# Patient Record
Sex: MALE | Race: WHITE | NOT HISPANIC OR LATINO | URBAN - METROPOLITAN AREA
[De-identification: names, ages, dates, MRNs, and addresses within clinical notes are randomized per-mention and may not be internally consistent; named-entity substitution may affect disease eponyms.]

---

## 2019-04-30 ENCOUNTER — EMERGENCY (EMERGENCY)
Facility: HOSPITAL | Age: 44
LOS: 1 days | Discharge: HOME | End: 2019-04-30
Attending: EMERGENCY MEDICINE | Admitting: EMERGENCY MEDICINE
Payer: COMMERCIAL

## 2019-04-30 VITALS
TEMPERATURE: 98 F | HEART RATE: 112 BPM | RESPIRATION RATE: 18 BRPM | HEIGHT: 66 IN | OXYGEN SATURATION: 100 % | WEIGHT: 222.01 LBS | SYSTOLIC BLOOD PRESSURE: 141 MMHG | DIASTOLIC BLOOD PRESSURE: 87 MMHG

## 2019-04-30 PROCEDURE — 99284 EMERGENCY DEPT VISIT MOD MDM: CPT | Mod: 25

## 2019-04-30 PROCEDURE — 93010 ELECTROCARDIOGRAM REPORT: CPT

## 2019-04-30 NOTE — ED ADULT TRIAGE NOTE - CHIEF COMPLAINT QUOTE
Pt presents to ED for eval of left lower leg swelling since Saturday. Pt states that pain and swelling has been increasing since Saturday. Pt c/o mild intermittent SOB, denies chest pain or pressure. States familial hx of dvt. Pt has hx of diabetes and smoking.

## 2019-05-01 VITALS
HEART RATE: 81 BPM | OXYGEN SATURATION: 100 % | SYSTOLIC BLOOD PRESSURE: 136 MMHG | DIASTOLIC BLOOD PRESSURE: 88 MMHG | RESPIRATION RATE: 18 BRPM | TEMPERATURE: 98 F

## 2019-05-01 LAB
ALBUMIN SERPL ELPH-MCNC: 4.7 G/DL — SIGNIFICANT CHANGE UP (ref 3.3–5)
ALP SERPL-CCNC: 75 U/L — SIGNIFICANT CHANGE UP (ref 40–120)
ALT FLD-CCNC: 20 U/L — SIGNIFICANT CHANGE UP (ref 4–41)
ANION GAP SERPL CALC-SCNC: 15 MMO/L — HIGH (ref 7–14)
APTT BLD: 49.3 SEC — HIGH (ref 27.5–36.3)
AST SERPL-CCNC: 10 U/L — SIGNIFICANT CHANGE UP (ref 4–40)
BASE EXCESS BLDV CALC-SCNC: 1.3 MMOL/L — SIGNIFICANT CHANGE UP
BASOPHILS # BLD AUTO: 0.11 K/UL — SIGNIFICANT CHANGE UP (ref 0–0.2)
BASOPHILS NFR BLD AUTO: 1.1 % — SIGNIFICANT CHANGE UP (ref 0–2)
BILIRUB SERPL-MCNC: 0.6 MG/DL — SIGNIFICANT CHANGE UP (ref 0.2–1.2)
BLOOD GAS VENOUS - CREATININE: 0.69 MG/DL — SIGNIFICANT CHANGE UP (ref 0.5–1.3)
BUN SERPL-MCNC: 19 MG/DL — SIGNIFICANT CHANGE UP (ref 7–23)
CALCIUM SERPL-MCNC: 9.8 MG/DL — SIGNIFICANT CHANGE UP (ref 8.4–10.5)
CHLORIDE BLDV-SCNC: 106 MMOL/L — SIGNIFICANT CHANGE UP (ref 96–108)
CHLORIDE SERPL-SCNC: 101 MMOL/L — SIGNIFICANT CHANGE UP (ref 98–107)
CO2 SERPL-SCNC: 24 MMOL/L — SIGNIFICANT CHANGE UP (ref 22–31)
CREAT SERPL-MCNC: 0.84 MG/DL — SIGNIFICANT CHANGE UP (ref 0.5–1.3)
EOSINOPHIL # BLD AUTO: 0.74 K/UL — HIGH (ref 0–0.5)
EOSINOPHIL NFR BLD AUTO: 7.6 % — HIGH (ref 0–6)
GAS PNL BLDV: 138 MMOL/L — SIGNIFICANT CHANGE UP (ref 136–146)
GLUCOSE BLDV-MCNC: 194 — HIGH (ref 70–99)
GLUCOSE SERPL-MCNC: 203 MG/DL — HIGH (ref 70–99)
HCO3 BLDV-SCNC: 24 MMOL/L — SIGNIFICANT CHANGE UP (ref 20–27)
HCT VFR BLD CALC: 46.6 % — SIGNIFICANT CHANGE UP (ref 39–50)
HCT VFR BLDV CALC: 52 % — HIGH (ref 39–51)
HGB BLD-MCNC: 16.3 G/DL — SIGNIFICANT CHANGE UP (ref 13–17)
HGB BLDV-MCNC: 17 G/DL — SIGNIFICANT CHANGE UP (ref 13–17)
IMM GRANULOCYTES NFR BLD AUTO: 0.5 % — SIGNIFICANT CHANGE UP (ref 0–1.5)
INR BLD: 0.97 — SIGNIFICANT CHANGE UP (ref 0.88–1.17)
LACTATE BLDV-MCNC: 1.5 MMOL/L — SIGNIFICANT CHANGE UP (ref 0.5–2)
LYMPHOCYTES # BLD AUTO: 1.57 K/UL — SIGNIFICANT CHANGE UP (ref 1–3.3)
LYMPHOCYTES # BLD AUTO: 16.2 % — SIGNIFICANT CHANGE UP (ref 13–44)
MCHC RBC-ENTMCNC: 34.7 PG — HIGH (ref 27–34)
MCHC RBC-ENTMCNC: 35 % — SIGNIFICANT CHANGE UP (ref 32–36)
MCV RBC AUTO: 99.1 FL — SIGNIFICANT CHANGE UP (ref 80–100)
MONOCYTES # BLD AUTO: 1 K/UL — HIGH (ref 0–0.9)
MONOCYTES NFR BLD AUTO: 10.3 % — SIGNIFICANT CHANGE UP (ref 2–14)
NEUTROPHILS # BLD AUTO: 6.23 K/UL — SIGNIFICANT CHANGE UP (ref 1.8–7.4)
NEUTROPHILS NFR BLD AUTO: 64.3 % — SIGNIFICANT CHANGE UP (ref 43–77)
NRBC # FLD: 0 K/UL — SIGNIFICANT CHANGE UP (ref 0–0)
PCO2 BLDV: 49 MMHG — SIGNIFICANT CHANGE UP (ref 41–51)
PH BLDV: 7.35 PH — SIGNIFICANT CHANGE UP (ref 7.32–7.43)
PLATELET # BLD AUTO: 245 K/UL — SIGNIFICANT CHANGE UP (ref 150–400)
PMV BLD: 10.7 FL — SIGNIFICANT CHANGE UP (ref 7–13)
PO2 BLDV: 37 MMHG — SIGNIFICANT CHANGE UP (ref 35–40)
POTASSIUM BLDV-SCNC: 4 MMOL/L — SIGNIFICANT CHANGE UP (ref 3.4–4.5)
POTASSIUM SERPL-MCNC: 4.1 MMOL/L — SIGNIFICANT CHANGE UP (ref 3.5–5.3)
POTASSIUM SERPL-SCNC: 4.1 MMOL/L — SIGNIFICANT CHANGE UP (ref 3.5–5.3)
PROT SERPL-MCNC: 7.4 G/DL — SIGNIFICANT CHANGE UP (ref 6–8.3)
PROTHROM AB SERPL-ACNC: 10.8 SEC — SIGNIFICANT CHANGE UP (ref 9.8–13.1)
RBC # BLD: 4.7 M/UL — SIGNIFICANT CHANGE UP (ref 4.2–5.8)
RBC # FLD: 11.5 % — SIGNIFICANT CHANGE UP (ref 10.3–14.5)
SAO2 % BLDV: 69.5 % — SIGNIFICANT CHANGE UP (ref 60–85)
SODIUM SERPL-SCNC: 140 MMOL/L — SIGNIFICANT CHANGE UP (ref 135–145)
TROPONIN T, HIGH SENSITIVITY: 7 NG/L — SIGNIFICANT CHANGE UP (ref ?–14)
TROPONIN T, HIGH SENSITIVITY: 7 NG/L — SIGNIFICANT CHANGE UP (ref ?–14)
WBC # BLD: 9.7 K/UL — SIGNIFICANT CHANGE UP (ref 3.8–10.5)
WBC # FLD AUTO: 9.7 K/UL — SIGNIFICANT CHANGE UP (ref 3.8–10.5)

## 2019-05-01 PROCEDURE — 93971 EXTREMITY STUDY: CPT | Mod: 26,LT

## 2019-05-01 PROCEDURE — 71275 CT ANGIOGRAPHY CHEST: CPT | Mod: 26

## 2019-05-01 RX ORDER — KETOROLAC TROMETHAMINE 30 MG/ML
15 SYRINGE (ML) INJECTION ONCE
Qty: 0 | Refills: 0 | Status: DISCONTINUED | OUTPATIENT
Start: 2019-05-01 | End: 2019-05-01

## 2019-05-01 RX ORDER — SODIUM CHLORIDE 9 MG/ML
1000 INJECTION INTRAMUSCULAR; INTRAVENOUS; SUBCUTANEOUS ONCE
Qty: 0 | Refills: 0 | Status: COMPLETED | OUTPATIENT
Start: 2019-05-01 | End: 2019-05-01

## 2019-05-01 RX ADMIN — SODIUM CHLORIDE 1000 MILLILITER(S): 9 INJECTION INTRAMUSCULAR; INTRAVENOUS; SUBCUTANEOUS at 00:56

## 2019-05-01 RX ADMIN — Medication 15 MILLIGRAM(S): at 00:56

## 2019-05-01 NOTE — ED PROVIDER NOTE - CLINICAL SUMMARY MEDICAL DECISION MAKING FREE TEXT BOX
44 year old male with a PMHx of DM, atopic dermatitis, +tobacco use pw atraumatic left lower extremity pain, swelling and redness for the past week.   Plan: labs, duplex r/o DVT

## 2019-05-01 NOTE — ED ADULT NURSE REASSESSMENT NOTE - NS ED NURSE REASSESS COMMENT FT1
Patient is awake, A&O x 3, NAD, resting in bed, appear comfortable and in no apparent distress.  Denies any pain at this time.  Vitals taken and will continue to monitor patient.

## 2019-05-01 NOTE — ED ADULT NURSE NOTE - OBJECTIVE STATEMENT
pt brought into intake room 1 A&OX4 ambulatory self care male presents to the ed today for bilateral leg pain. Patient states he has a family history of blood clots and is nervous due to hot, painful and swollen area on left leg. PT speaking in clear and full sentences and denies SOB. 18G IV placed in right AC. Labs drawn and sent.

## 2019-05-01 NOTE — ED PROVIDER NOTE - OBJECTIVE STATEMENT
44 year old male with a PMHx of DM, atopic dermatitis, +tobacco use pw atraumatic left lower extremity pain, swelling and redness for the past week. Pt states that tonight pain got worse. Endorses that for the past month he has been progressively getting increased [place]. Endorses that he has a family hx of DVT. Drives multiple hours daily for work and traveled to [facility] this past weekend. He has a chronic rash on bl lower extremities which is unchanged. Denies n/v/f/c, abdominal pain, dysuria, hematuria, melena, hematochezia, diarrhea, constipation, hemoptysis.

## 2019-05-01 NOTE — ED PROVIDER NOTE - ATTENDING CONTRIBUTION TO CARE
Dwyer: 44 yom DM, family history of DVT, smoker complaining of left leg pain and swelling after long car trip. PT also noted chest tightness and shortness of breath.  Pt is well appearing, no distress, clear lungs, normal cardiac, abd soft and non tender, hr over 100 in triage, left leg +calf larger than right side, no calf tn, no pitting edema. EKG unremarkable.  Pt is high risk for PE/DVT so labs and CT ordered, signed out to PA/MD to follow up results.

## 2019-05-01 NOTE — ED PROVIDER NOTE - PROGRESS NOTE DETAILS
carlos hanks: signed out to me by carlos weir to f.u CTA/ duplex. if negative dc. imaging within normal limits. all copies of reports given to patient. return precautions given. ready for dc.

## 2019-05-01 NOTE — ED PROVIDER NOTE - NSFOLLOWUPINSTRUCTIONS_ED_ALL_ED_FT
Follow up with your primary care physician in 48-72 hours- bring copies of your results.    Return to the Emergency Department for fevers, worsening or persistent symptoms OR ANY NEW OR CONCERNING SYMPTOMS.

## 2021-04-12 ENCOUNTER — INPATIENT (INPATIENT)
Facility: HOSPITAL | Age: 46
LOS: 3 days | Discharge: HOME | End: 2021-04-16
Attending: INTERNAL MEDICINE | Admitting: INTERNAL MEDICINE
Payer: COMMERCIAL

## 2021-04-12 VITALS
HEIGHT: 66 IN | SYSTOLIC BLOOD PRESSURE: 113 MMHG | TEMPERATURE: 98 F | DIASTOLIC BLOOD PRESSURE: 86 MMHG | OXYGEN SATURATION: 100 % | HEART RATE: 124 BPM | RESPIRATION RATE: 18 BRPM

## 2021-04-12 DIAGNOSIS — K12.2 CELLULITIS AND ABSCESS OF MOUTH: ICD-10-CM

## 2021-04-12 PROBLEM — L20.9 ATOPIC DERMATITIS, UNSPECIFIED: Chronic | Status: ACTIVE | Noted: 2019-05-01

## 2021-04-12 PROBLEM — E11.9 TYPE 2 DIABETES MELLITUS WITHOUT COMPLICATIONS: Chronic | Status: ACTIVE | Noted: 2019-05-01

## 2021-04-12 LAB
ALBUMIN SERPL ELPH-MCNC: 4.3 G/DL — SIGNIFICANT CHANGE UP (ref 3.3–5)
ALP SERPL-CCNC: 104 U/L — SIGNIFICANT CHANGE UP (ref 40–120)
ALT FLD-CCNC: 15 U/L — SIGNIFICANT CHANGE UP (ref 4–41)
ANION GAP SERPL CALC-SCNC: 27 MMOL/L — HIGH (ref 7–14)
ANION GAP SERPL CALC-SCNC: 33 MMOL/L — HIGH (ref 7–14)
APTT BLD: 52.2 SEC — HIGH (ref 27–36.3)
AST SERPL-CCNC: 12 U/L — SIGNIFICANT CHANGE UP (ref 4–40)
BASE EXCESS BLDV CALC-SCNC: -10.1 MMOL/L — LOW (ref -3–2)
BASOPHILS # BLD AUTO: 0.06 K/UL — SIGNIFICANT CHANGE UP (ref 0–0.2)
BASOPHILS # BLD AUTO: 0.06 K/UL — SIGNIFICANT CHANGE UP (ref 0–0.2)
BASOPHILS NFR BLD AUTO: 0.3 % — SIGNIFICANT CHANGE UP (ref 0–2)
BASOPHILS NFR BLD AUTO: 0.3 % — SIGNIFICANT CHANGE UP (ref 0–2)
BILIRUB SERPL-MCNC: 0.2 MG/DL — SIGNIFICANT CHANGE UP (ref 0.2–1.2)
BLOOD GAS VENOUS - CREATININE: 4.6 MG/DL — HIGH (ref 0.5–1.3)
BLOOD GAS VENOUS COMPREHENSIVE RESULT: SIGNIFICANT CHANGE UP
BLOOD GAS VENOUS COMPREHENSIVE RESULT: SIGNIFICANT CHANGE UP
BUN SERPL-MCNC: 96 MG/DL — HIGH (ref 7–23)
BUN SERPL-MCNC: 98 MG/DL — HIGH (ref 7–23)
CALCIUM SERPL-MCNC: 8.5 MG/DL — SIGNIFICANT CHANGE UP (ref 8.4–10.5)
CALCIUM SERPL-MCNC: 9.5 MG/DL — SIGNIFICANT CHANGE UP (ref 8.4–10.5)
CHLORIDE BLDV-SCNC: 100 MMOL/L — SIGNIFICANT CHANGE UP (ref 96–108)
CHLORIDE SERPL-SCNC: 81 MMOL/L — LOW (ref 98–107)
CHLORIDE SERPL-SCNC: 88 MMOL/L — LOW (ref 98–107)
CO2 SERPL-SCNC: 12 MMOL/L — LOW (ref 22–31)
CO2 SERPL-SCNC: 15 MMOL/L — LOW (ref 22–31)
CREAT SERPL-MCNC: 4.29 MG/DL — HIGH (ref 0.5–1.3)
CREAT SERPL-MCNC: 4.55 MG/DL — HIGH (ref 0.5–1.3)
EOSINOPHIL # BLD AUTO: 0.04 K/UL — SIGNIFICANT CHANGE UP (ref 0–0.5)
EOSINOPHIL # BLD AUTO: 0.1 K/UL — SIGNIFICANT CHANGE UP (ref 0–0.5)
EOSINOPHIL NFR BLD AUTO: 0.2 % — SIGNIFICANT CHANGE UP (ref 0–6)
EOSINOPHIL NFR BLD AUTO: 0.5 % — SIGNIFICANT CHANGE UP (ref 0–6)
GAS PNL BLDV: 127 MMOL/L — LOW (ref 136–146)
GLUCOSE BLDC GLUCOMTR-MCNC: 135 MG/DL — HIGH (ref 70–99)
GLUCOSE BLDC GLUCOMTR-MCNC: 150 MG/DL — HIGH (ref 70–99)
GLUCOSE BLDC GLUCOMTR-MCNC: 159 MG/DL — HIGH (ref 70–99)
GLUCOSE BLDC GLUCOMTR-MCNC: 222 MG/DL — HIGH (ref 70–99)
GLUCOSE BLDC GLUCOMTR-MCNC: 261 MG/DL — HIGH (ref 70–99)
GLUCOSE BLDC GLUCOMTR-MCNC: 409 MG/DL — HIGH (ref 70–99)
GLUCOSE BLDC GLUCOMTR-MCNC: 459 MG/DL — CRITICAL HIGH (ref 70–99)
GLUCOSE BLDV-MCNC: 341 MG/DL — HIGH (ref 70–99)
GLUCOSE SERPL-MCNC: 350 MG/DL — HIGH (ref 70–99)
GLUCOSE SERPL-MCNC: 427 MG/DL — HIGH (ref 70–99)
HCO3 BLDV-SCNC: 14 MMOL/L — LOW (ref 20–27)
HCT VFR BLD CALC: 45.7 % — SIGNIFICANT CHANGE UP (ref 39–50)
HCT VFR BLD CALC: 48.2 % — SIGNIFICANT CHANGE UP (ref 39–50)
HCT VFR BLDA CALC: 50.4 % — SIGNIFICANT CHANGE UP (ref 39–51)
HGB BLD CALC-MCNC: 16.5 G/DL — SIGNIFICANT CHANGE UP (ref 13–17)
HGB BLD-MCNC: 16.6 G/DL — SIGNIFICANT CHANGE UP (ref 13–17)
HGB BLD-MCNC: 17.7 G/DL — HIGH (ref 13–17)
IANC: 17.93 K/UL — HIGH (ref 1.5–8.5)
IANC: 18.29 K/UL — HIGH (ref 1.5–8.5)
IMM GRANULOCYTES NFR BLD AUTO: 0.6 % — SIGNIFICANT CHANGE UP (ref 0–1.5)
IMM GRANULOCYTES NFR BLD AUTO: 0.6 % — SIGNIFICANT CHANGE UP (ref 0–1.5)
INR BLD: 1.29 RATIO — HIGH (ref 0.88–1.16)
LACTATE BLDV-MCNC: 1.8 MMOL/L — SIGNIFICANT CHANGE UP (ref 0.5–2)
LG PLATELETS BLD QL AUTO: SLIGHT — SIGNIFICANT CHANGE UP
LYMPHOCYTES # BLD AUTO: 0.56 K/UL — LOW (ref 1–3.3)
LYMPHOCYTES # BLD AUTO: 0.79 K/UL — LOW (ref 1–3.3)
LYMPHOCYTES # BLD AUTO: 2.7 % — LOW (ref 13–44)
LYMPHOCYTES # BLD AUTO: 3.6 % — LOW (ref 13–44)
MAGNESIUM SERPL-MCNC: 3.1 MG/DL — HIGH (ref 1.6–2.6)
MANUAL SMEAR VERIFICATION: SIGNIFICANT CHANGE UP
MCHC RBC-ENTMCNC: 34.6 PG — HIGH (ref 27–34)
MCHC RBC-ENTMCNC: 34.7 PG — HIGH (ref 27–34)
MCHC RBC-ENTMCNC: 36.3 GM/DL — HIGH (ref 32–36)
MCHC RBC-ENTMCNC: 36.7 GM/DL — HIGH (ref 32–36)
MCV RBC AUTO: 94.1 FL — SIGNIFICANT CHANGE UP (ref 80–100)
MCV RBC AUTO: 95.4 FL — SIGNIFICANT CHANGE UP (ref 80–100)
MONOCYTES # BLD AUTO: 1.86 K/UL — HIGH (ref 0–0.9)
MONOCYTES # BLD AUTO: 2.76 K/UL — HIGH (ref 0–0.9)
MONOCYTES NFR BLD AUTO: 12.7 % — SIGNIFICANT CHANGE UP (ref 2–14)
MONOCYTES NFR BLD AUTO: 8.9 % — SIGNIFICANT CHANGE UP (ref 2–14)
NEUTROPHILS # BLD AUTO: 17.93 K/UL — HIGH (ref 1.8–7.4)
NEUTROPHILS # BLD AUTO: 18.29 K/UL — HIGH (ref 1.8–7.4)
NEUTROPHILS NFR BLD AUTO: 82.3 % — HIGH (ref 43–77)
NEUTROPHILS NFR BLD AUTO: 87.3 % — HIGH (ref 43–77)
NRBC # BLD: 0 /100 WBCS — SIGNIFICANT CHANGE UP
NRBC # BLD: 0 /100 WBCS — SIGNIFICANT CHANGE UP
NRBC # FLD: 0 K/UL — SIGNIFICANT CHANGE UP
NRBC # FLD: 0 K/UL — SIGNIFICANT CHANGE UP
PCO2 BLDV: 46 MMHG — SIGNIFICANT CHANGE UP (ref 42–55)
PH BLDV: 7.19 — CRITICAL LOW (ref 7.32–7.43)
PHOSPHATE SERPL-MCNC: 7.4 MG/DL — HIGH (ref 2.5–4.5)
PLAT MORPH BLD: ABNORMAL
PLATELET # BLD AUTO: 267 K/UL — SIGNIFICANT CHANGE UP (ref 150–400)
PLATELET # BLD AUTO: 312 K/UL — SIGNIFICANT CHANGE UP (ref 150–400)
PLATELET COUNT - ESTIMATE: NORMAL — SIGNIFICANT CHANGE UP
PO2 BLDV: <24 MMHG — LOW (ref 35–40)
POTASSIUM BLDV-SCNC: 4.5 MMOL/L — SIGNIFICANT CHANGE UP (ref 3.4–4.5)
POTASSIUM SERPL-MCNC: 4.7 MMOL/L — SIGNIFICANT CHANGE UP (ref 3.5–5.3)
POTASSIUM SERPL-MCNC: 5 MMOL/L — SIGNIFICANT CHANGE UP (ref 3.5–5.3)
POTASSIUM SERPL-SCNC: 4.7 MMOL/L — SIGNIFICANT CHANGE UP (ref 3.5–5.3)
POTASSIUM SERPL-SCNC: 5 MMOL/L — SIGNIFICANT CHANGE UP (ref 3.5–5.3)
PROT SERPL-MCNC: 8.4 G/DL — HIGH (ref 6–8.3)
PROTHROM AB SERPL-ACNC: 14.5 SEC — HIGH (ref 10.6–13.6)
RBC # BLD: 4.79 M/UL — SIGNIFICANT CHANGE UP (ref 4.2–5.8)
RBC # BLD: 5.12 M/UL — SIGNIFICANT CHANGE UP (ref 4.2–5.8)
RBC # FLD: 11.6 % — SIGNIFICANT CHANGE UP (ref 10.3–14.5)
RBC # FLD: 11.6 % — SIGNIFICANT CHANGE UP (ref 10.3–14.5)
RBC BLD AUTO: NORMAL — SIGNIFICANT CHANGE UP
SAO2 % BLDV: 31 % — LOW (ref 60–85)
SARS-COV-2 RNA SPEC QL NAA+PROBE: SIGNIFICANT CHANGE UP
SODIUM SERPL-SCNC: 126 MMOL/L — LOW (ref 135–145)
SODIUM SERPL-SCNC: 130 MMOL/L — LOW (ref 135–145)
WBC # BLD: 20.93 K/UL — HIGH (ref 3.8–10.5)
WBC # BLD: 21.76 K/UL — HIGH (ref 3.8–10.5)
WBC # FLD AUTO: 20.93 K/UL — HIGH (ref 3.8–10.5)
WBC # FLD AUTO: 21.76 K/UL — HIGH (ref 3.8–10.5)

## 2021-04-12 PROCEDURE — 99291 CRITICAL CARE FIRST HOUR: CPT

## 2021-04-12 PROCEDURE — 70490 CT SOFT TISSUE NECK W/O DYE: CPT | Mod: 26

## 2021-04-12 PROCEDURE — 71045 X-RAY EXAM CHEST 1 VIEW: CPT | Mod: 26

## 2021-04-12 RX ORDER — SODIUM CHLORIDE 9 MG/ML
1000 INJECTION, SOLUTION INTRAVENOUS
Refills: 0 | Status: DISCONTINUED | OUTPATIENT
Start: 2021-04-12 | End: 2021-04-12

## 2021-04-12 RX ORDER — HYDROMORPHONE HYDROCHLORIDE 2 MG/ML
0.5 INJECTION INTRAMUSCULAR; INTRAVENOUS; SUBCUTANEOUS EVERY 4 HOURS
Refills: 0 | Status: DISCONTINUED | OUTPATIENT
Start: 2021-04-12 | End: 2021-04-15

## 2021-04-12 RX ORDER — INSULIN HUMAN 100 [IU]/ML
5 INJECTION, SOLUTION SUBCUTANEOUS
Qty: 100 | Refills: 0 | Status: DISCONTINUED | OUTPATIENT
Start: 2021-04-12 | End: 2021-04-14

## 2021-04-12 RX ORDER — PIPERACILLIN AND TAZOBACTAM 4; .5 G/20ML; G/20ML
3.38 INJECTION, POWDER, LYOPHILIZED, FOR SOLUTION INTRAVENOUS ONCE
Refills: 0 | Status: COMPLETED | OUTPATIENT
Start: 2021-04-12 | End: 2021-04-12

## 2021-04-12 RX ORDER — HYDROMORPHONE HYDROCHLORIDE 2 MG/ML
1 INJECTION INTRAMUSCULAR; INTRAVENOUS; SUBCUTANEOUS EVERY 4 HOURS
Refills: 0 | Status: DISCONTINUED | OUTPATIENT
Start: 2021-04-12 | End: 2021-04-15

## 2021-04-12 RX ORDER — DEXTROSE 10 % IN WATER 10 %
1000 INTRAVENOUS SOLUTION INTRAVENOUS
Refills: 0 | Status: DISCONTINUED | OUTPATIENT
Start: 2021-04-12 | End: 2021-04-12

## 2021-04-12 RX ORDER — SIMVASTATIN 20 MG/1
1 TABLET, FILM COATED ORAL
Qty: 0 | Refills: 0 | DISCHARGE

## 2021-04-12 RX ORDER — SODIUM CHLORIDE 9 MG/ML
2000 INJECTION INTRAMUSCULAR; INTRAVENOUS; SUBCUTANEOUS ONCE
Refills: 0 | Status: COMPLETED | OUTPATIENT
Start: 2021-04-12 | End: 2021-04-12

## 2021-04-12 RX ORDER — SODIUM CHLORIDE 9 MG/ML
1000 INJECTION, SOLUTION INTRAVENOUS
Refills: 0 | Status: DISCONTINUED | OUTPATIENT
Start: 2021-04-12 | End: 2021-04-13

## 2021-04-12 RX ORDER — HYDROMORPHONE HYDROCHLORIDE 2 MG/ML
1 INJECTION INTRAMUSCULAR; INTRAVENOUS; SUBCUTANEOUS ONCE
Refills: 0 | Status: DISCONTINUED | OUTPATIENT
Start: 2021-04-12 | End: 2021-04-12

## 2021-04-12 RX ORDER — CHLORHEXIDINE GLUCONATE 213 G/1000ML
1 SOLUTION TOPICAL
Refills: 0 | Status: DISCONTINUED | OUTPATIENT
Start: 2021-04-12 | End: 2021-04-16

## 2021-04-12 RX ORDER — VANCOMYCIN HCL 1 G
1000 VIAL (EA) INTRAVENOUS ONCE
Refills: 0 | Status: COMPLETED | OUTPATIENT
Start: 2021-04-12 | End: 2021-04-12

## 2021-04-12 RX ORDER — DILTIAZEM HCL 120 MG
60 CAPSULE, EXT RELEASE 24 HR ORAL ONCE
Refills: 0 | Status: DISCONTINUED | OUTPATIENT
Start: 2021-04-12 | End: 2021-04-12

## 2021-04-12 RX ORDER — ACETAMINOPHEN 500 MG
1000 TABLET ORAL ONCE
Refills: 0 | Status: COMPLETED | OUTPATIENT
Start: 2021-04-12 | End: 2021-04-12

## 2021-04-12 RX ORDER — PIPERACILLIN AND TAZOBACTAM 4; .5 G/20ML; G/20ML
3.38 INJECTION, POWDER, LYOPHILIZED, FOR SOLUTION INTRAVENOUS EVERY 12 HOURS
Refills: 0 | Status: DISCONTINUED | OUTPATIENT
Start: 2021-04-12 | End: 2021-04-14

## 2021-04-12 RX ADMIN — PIPERACILLIN AND TAZOBACTAM 25 GRAM(S): 4; .5 INJECTION, POWDER, LYOPHILIZED, FOR SOLUTION INTRAVENOUS at 18:42

## 2021-04-12 RX ADMIN — HYDROMORPHONE HYDROCHLORIDE 0.5 MILLIGRAM(S): 2 INJECTION INTRAMUSCULAR; INTRAVENOUS; SUBCUTANEOUS at 18:37

## 2021-04-12 RX ADMIN — HYDROMORPHONE HYDROCHLORIDE 0.5 MILLIGRAM(S): 2 INJECTION INTRAMUSCULAR; INTRAVENOUS; SUBCUTANEOUS at 18:28

## 2021-04-12 RX ADMIN — SODIUM CHLORIDE 150 MILLILITER(S): 9 INJECTION, SOLUTION INTRAVENOUS at 22:16

## 2021-04-12 RX ADMIN — INSULIN HUMAN 7 UNIT(S)/HR: 100 INJECTION, SOLUTION SUBCUTANEOUS at 18:50

## 2021-04-12 RX ADMIN — SODIUM CHLORIDE 150 MILLILITER(S): 9 INJECTION, SOLUTION INTRAVENOUS at 18:01

## 2021-04-12 RX ADMIN — PIPERACILLIN AND TAZOBACTAM 200 GRAM(S): 4; .5 INJECTION, POWDER, LYOPHILIZED, FOR SOLUTION INTRAVENOUS at 11:42

## 2021-04-12 RX ADMIN — INSULIN HUMAN 9 UNIT(S)/HR: 100 INJECTION, SOLUTION SUBCUTANEOUS at 17:47

## 2021-04-12 RX ADMIN — INSULIN HUMAN 9 UNIT(S)/HR: 100 INJECTION, SOLUTION SUBCUTANEOUS at 16:33

## 2021-04-12 RX ADMIN — Medication 30 MILLILITER(S): at 21:17

## 2021-04-12 RX ADMIN — Medication 400 MILLIGRAM(S): at 18:39

## 2021-04-12 RX ADMIN — HYDROMORPHONE HYDROCHLORIDE 1 MILLIGRAM(S): 2 INJECTION INTRAMUSCULAR; INTRAVENOUS; SUBCUTANEOUS at 11:42

## 2021-04-12 RX ADMIN — SODIUM CHLORIDE 2000 MILLILITER(S): 9 INJECTION INTRAMUSCULAR; INTRAVENOUS; SUBCUTANEOUS at 16:00

## 2021-04-12 RX ADMIN — Medication 1000 MILLIGRAM(S): at 18:42

## 2021-04-12 RX ADMIN — Medication 250 MILLIGRAM(S): at 16:00

## 2021-04-12 NOTE — ED PROVIDER NOTE - ATTENDING CONTRIBUTION TO CARE
Upon my evaluation, this patient had a high probability of imminent or life-threatening deterioration due to submandibular abscess and DKA, which required my direct attention, intervention, and personal management.  The patient has a  medical condition that impairs one or more vital organ systems.  Frequent personal assessment and adjustment of medical interventions was performed.      I have personally provided 35 minutes of critical care time exclusive of time spent on separately billable procedures. Time includes review of laboratory data, radiology results, discussion with consultants, patient and family; monitoring for potential decompensation, as well as time spent retrieving data and reviewing the chart and documenting the visit. Interventions were performed as documented above.    BRYNN Coronado MD: I have personally performed a face to face bedside history and physical examination of this patient. I have discussed the history, examination, review of systems, assessment and plan of management with the resident. I have reviewed the electronic medical record and amended it to reflect my history, review of systems, physical exam, assessment and plan.

## 2021-04-12 NOTE — H&P ADULT - ATTENDING COMMENTS
pt is a 45 yo male with hx root canal 4/5 who presents with difficulty opening  jaw, in the er noted elevated glucose, noted elevated anion gap 37 ,  bicarb 12  wbc 21,  Heent neck supple, right submandibular swelling,  lungs clear,  abdomen benign, ext no edema,   labs as above, ct scan showing a submandibular phlegmon,     Pt seen by omfs and had phlegmon drained,       -45 yo male with dka, submandibular abscess  -iv fluid 2 liters, continue lr at 150 cc/hr,    -iv insulin, at 10 units /hr, monitor fs q 1 hrs  -check bmp q 4 hrs  -monitor urine output  pt with dka and sepsis, critically ill,  managed  at bedside x 40 minutes

## 2021-04-12 NOTE — H&P ADULT - NSHPSOCIALHISTORY_GEN_ALL_CORE
Lives in [place]  Works in [employer]  Current 2PPD smoker for 34 years  Drinks socially  Denies other drugs

## 2021-04-12 NOTE — H&P ADULT - NSHPLABSRESULTS_GEN_ALL_CORE
04-12    126<L>  |  81<L>  |  96<H>  ----------------------------<  427<H>  4.7   |  12<L>  |  4.55<H>    Ca    9.5      12 Apr 2021 12:18    TPro  8.4<H>  /  Alb  4.3  /  TBili  0.2  /  DBili  x   /  AST  12  /  ALT  15  /  AlkPhos  104  04-12                        17.7   21.76 )-----------( 312      ( 12 Apr 2021 12:18 )             48.2       LIVER FUNCTIONS - ( 12 Apr 2021 12:18 )  Alb: 4.3 g/dL / Pro: 8.4 g/dL / ALK PHOS: 104 U/L / ALT: 15 U/L / AST: 12 U/L / GGT: x             PT/INR - ( 12 Apr 2021 12:18 )   PT: 14.5 sec;   INR: 1.29 ratio         PTT - ( 12 Apr 2021 12:18 )  PTT:52.2 sec

## 2021-04-12 NOTE — ED ADULT TRIAGE NOTE - CHIEF COMPLAINT QUOTE
pt sent to ED by oral surgeon.  pt had root canal wednesday and is now having swelling to lower mouth and neck.  pt reports SOB when walking.

## 2021-04-12 NOTE — ED PROVIDER NOTE - CARE PLAN
Principal Discharge DX:	Submandibular abscess  Secondary Diagnosis:	DKA (diabetic ketoacidoses)  Secondary Diagnosis:	TWYLA (acute kidney injury)

## 2021-04-12 NOTE — PATIENT PROFILE ADULT - NSPROPOAPRESSUREINJURY_GEN_A_NUR
Referred by: Sofia Waller DO; Medical Diagnosis (from order):    Diagnosis Information      Diagnosis    V45.89 (ICD-9-CM) - Z98.890 (ICD-10-CM) - S/P arthroscopy of right shoulder                Physical Therapy -  Daily Treatment Note    Visit:  5     SUBJECTIVE                                                                                                             Appt with Dr. Waller went ok. I have good days and bad days. I just maneuvered at his office so it's a little sore.    Functional Change: Was weaning out of sling this past week. No sling at all per Dr. Waller starting today.       Pain / Symptoms:  Pain rating (out of 10): Current: 3     OBJECTIVE                                                                                                                     Range of Motion (ROM)   (norms in parentheses, degrees unless noted, active unless noted):   Shoulder:     - Flexion (180):        • Right: 130 pain     - Abduction (180):        • Right: 170    - Internal Rotation at 45°:         • Right: 45    - External Rotation at 45°:         • Right: 60        TREATMENT                                                                                                                  Therapeutic Exercise:  PROM R shoulder flexion, scaption, IR to 45, ER, elbow, forearm, wrist  AROM R elbow, forearm, wrist  AAROM: supine bench press x 15 with dowel  AAROM scaption x 10 with dowel  SA punches x 15  Side lying scapular retraction and depression x 10      + ER AROM x 10  Seated scapular retraction and depression x 10  Side stepping isometrics IR and ER x 10 ea OTB      Skilled input: verbal instruction/cues    Writer verbally educated and received verbal consent for hand placement, positioning of patient, and techniques to be performed today from patient     Home Exercise Program: (*above indicates provided as part of home exercise program)  AROM R elbow, forearm, wrist  AAROM: supine bench press x 15 with  dowel  AAROM scaption x 10 with dowel  SA punches x 15  Side lying scapular retraction and depression x 10      + ER AROM x 10  Seated scapular retraction and depression x 10  Side stepping isometrics IR and ER x 10 ea OTB      ASSESSMENT                                                                                                             Shoulder PROM has improved in all directions with some pain at end ranges. Good demo of AAROM as well. Unable to achieve full AROM ER in side lying.     Pain/symptoms after session: 4  Patient Education:   Results of above outlined education: Verbalizes understanding and Demonstrates understanding     PLAN                                                                                                                             Suggestions for next session as indicated: Progress per plan of care; progress per protocol as able, shoulder P/AA/AROM       Procedures and total treatment time documented Time Entry flowsheet.     no

## 2021-04-12 NOTE — ED PROVIDER NOTE - CLINICAL SUMMARY MEDICAL DECISION MAKING FREE TEXT BOX
DM, p.w neck and toothache s/p recent root canal last week. airway patent, uvula midline, b/l submandibular swelling and TTP base of tongue. concerning for lugwin's. will get basic labs for anemia, leukocytosis and electrolyte derangement eval. will get CT imaging, pain control, IVF, and abx. concerning for sepsis. will consult ENT and OMFS DM, p.w neck and toothache s/p recent root canal last week. airway patent, uvula midline, b/l submandibular swelling and TTP base of tongue. concerning for lauro's. will get basic labs for anemia, leukocytosis and electrolyte derangement eval. will get CT imaging, pain control, IVF, and abx. concerning for sepsis. will consult ENT and OMFS

## 2021-04-12 NOTE — ED ADULT NURSE NOTE - OBJECTIVE STATEMENT
Pt sent by provider for further eval, c/o of difficulty swallowing r/t facial swelling to right lower mandible. pt had root canal on Wednesday, discharged with clindamycin. Pt admits to poor appetite and pain to area. Pt denies fever, chills, breathing difficulty, n/v/d, headache, visual changes. Respirations even/unlabored, able to speak in full complete sentences, 100% room air, right sided mandibular swelling noted, 20g iv to right ac, labs drawn and sent, sinus tachy on monitor, MD Coronado at bedside to eval.

## 2021-04-12 NOTE — CONSULT NOTE ADULT - SUBJECTIVE AND OBJECTIVE BOX
HPI:  Patient is a 46y Male with PMH significant for DM, had root canal on tooth #29 on 4/7, now p/w with worsening swelling below jaw. red, warm. has odynophagia but no dysphagia. no sob. no fevers or chills. has been on clinda x5d      Physical Exam  T(C): 36.3 (04-12-21 @ 11:32), Max: 36.4 (04-12-21 @ 11:04)  HR: 119 (04-12-21 @ 11:32) (119 - 124)  BP: 135/92 (04-12-21 @ 11:32) (113/86 - 135/92)  RR: 16 (04-12-21 @ 11:32) (16 - 18)  SpO2: 100% (04-12-21 @ 11:32) (100% - 100%)    General: NAD, A+Ox3  No respiratory distress, stridor, or stertor  patient has trismus, 2 finger breadth maximal opening  no restricted neck ROM  FOM soft b/l, some purulent material seen in oral cavity. tooth #29 itself s/p root canal, no purulence seen around tooth, no tooth tenderness  right level 1a/b swelling, with erythema and tenderness to palpation. feels soft and ?fluctuant  FOM and tongue are not elevated    Fiberoptic Nasopharyngolaryngoscopy (NPL):   Nasopharynx wnl  BOT/vallecula normal  Epiglottis sharp  AE folds nonedematous  Arytenoids mobile  Vocal folds mobile bilaterally  No masses or lesions visualized in post cricoid space or pyriform sinuses bilaterally   airway widely patent on fiberoptic exam    A/P: 46M DM, s/p root canal tooth #29 on 4/7, now p/w R level 1a/b swelling, concerning for ?periosteal/submandibular abscess. FOE: wnl, airway patent.	  - CT neck with iv contrast  - continue IV abx  - OMFS consult  - if abscess is present, then patient will likely have to go to OR for drainage  - if intubation needed for anesthesia, nasotracheal intubation will have to be a consideration due to trismus.   - we will follow along. please call ENT for any acute change in airway status or SOB/resp distress    --------------------------------------------------------------  Thank you for the consult,    Eulogio Kent MD  Resident  Department of Otolaryngology - Head and Neck Surgery  Peds Page #[phone]  Adult Page #[phone]  ---------------------------------------------------------------

## 2021-04-12 NOTE — CHART NOTE - NSCHARTNOTEFT_GEN_A_CORE
Endocrine consult received.  Patient initiated on  IV insulin drip for DKA, admit to MICU.  Full consult to follow tomorrow.    Kilo Duran MD  Division of Endocrinology  Pager: [phone]    If after 6PM or before 9AM, or on weekends/holidays, please call endocrine answering service for assistance ([phone]).  For nonurgent matters email [email or web address] for assistance.

## 2021-04-12 NOTE — ED PROVIDER NOTE - PROGRESS NOTE DETAILS
Paras Orona, PGY 3: ENT will come see after CT. Paras Orona, PGY 3: ENT scoped and no edema of the vocal cords.

## 2021-04-12 NOTE — H&P ADULT - ASSESSMENT
46 y.o M with PMhx of DM presents to the ED with jaw swelling after root canal (4/7) found to have DKA and potential developing phlegmon/abscess in the right platysma     A/P:    Neuro  -No acute issues    Cardiac  -No active issues, hemodynamically stable    Pulm  #No acute issues    Renal (baseline 0.84)  #TWYLA  -likely pre-renal given severe fluid depletion in setting of DKA  -start with 2L LR bolus  -will continue to trend Cr to evaluate response to fluid resuscitation     GI  #No acute issues    ID  #No acute issues    Endo  #No acute issues    Heme  #No acute issues    Ethics  #No acute issues    PPx: HSQ; Protonix;   FEN: none; replete electrolytes PRN; NPO  Code status: Full      Dispo: c/w care on ICU  Garcia:  Access/Lines:  46 y.o M with PMhx of DM presents to the ED with jaw swelling after root canal (4/7) found to have DKA and potential developing phlegmon/abscess in the right platysma     A/P:    Neuro  -No acute issues    Cardiac  -No active issues, hemodynamically stable    Pulm  -patient with submandibular swelling, airway not compromised  -CXR unremarkable, on room air    Renal (baseline 0.84)  #TWYLA  -likely pre-renal given severe fluid depletion in setting of DKA  -start with 2L LR bolus and continue fluid resuscitation   -will continue to trend Cr to evaluate response to fluid resuscitation     #anion gap metabolic acidosis  -mediated by DKA  -AG of 33. pH 7.21. pCO2 35  -per Winter's formula pCO2 suggests concomitant respiratory acidosis  -will repeat blood gas throughout MICU course  -volume resuscitation as above    GI  #No acute issues    ID  #right platysma phlegmon/abscess  -initiated on vanc/zosyn  -BCx pending  -    Endo  #DKA  -started in on insulin gtt at 9U/hr  -will initiate DKA protocol while in the unit  -Endocrinology c/s    Heme  #No acute issues    Ethics  -Full code    PPx: none Protonix;   FEN: none; replete electrolytes PRN; NPO  Code status: Full      Dispo: c/w care on ICU A/P    46 y.o M with PMhx of DM presents to the ED with jaw swelling after root canal (4/7) found to have DKA and potential developing phlegmon/abscess in the right platysma     Neuro  #pain control  -acetaminophen IV prn  -for severe pain dilaudid prn given TWYLA    Cardiac  -No active issues, hemodynamically stable  -c/w simvastatin when can tolerate po for HLD  -to send lipid panel in AM    Pulm  -patient with submandibular swelling, airway not compromised on scope by ENT  -CXR unremarkable, on room air    Renal (baseline 0.84)  #TWYLA  -likely pre-renal given severe fluid depletion in setting of DKA  -start with 2L LR bolus and continue fluid resuscitation   -will continue to trend Cr to evaluate response to fluid resuscitation     #anion gap metabolic acidosis  -mediated by DKA  -AG of 33. pH 7.21. pCO2 35  -per Winter's formula pCO2 suggests concomitant respiratory acidosis  -will repeat blood gas throughout MICU course  -volume resuscitation as above    GI  -No acute issues  -npo for now given DKA protocol    ID  #right platysma phlegmon/abscess  -with noted leukocytosis +/- component of hemoconcentration due to volume contracted state  -initiated on vanc/zosyn  -BCx pending  -continue broad spectrum abx for now  -appreciate dental and OMFS recs  -will f/u OMFS if needs definitive source control with I/D    Endo  #DKA  -reports last A1c was approximately 6-7  -only on metformin and glyxambi at home  -started in on insulin gtt at 9U/hr  -will initiate DKA protocol while in the unit  -Endocrinology c/s, will eval on 4/13  -fluid resucitation as above  -to send A1c    Heme  -No acute issues    Ethics  -Full code    PPx: none Protonix;   FEN: none; replete electrolytes PRN; NPO  Code status: Full      Dispo: c/w care on ICU

## 2021-04-12 NOTE — ED PROVIDER NOTE - NS ED ROS FT
GENERAL: No fever or chills, weight changes, nightsweats  EYES: no change in vision  HEENT: no ear pain, rhinorrhea, epistasis   CARDIAC: no chest pain, palpitation   PULMONARY: SOB  GI: no abdominal pain, no nausea or no vomiting, no diarrhea or constipation  : No changes in urination for pain/freq.   SKIN: no rashes, abnormal bruising or bleeding  NEURO: no headache, numbness/tingling, extremity weakness   MSK: No joint pain

## 2021-04-12 NOTE — H&P ADULT - HISTORY OF PRESENT ILLNESS
46 y.o M with PMH of DM presents to the  46 y.o M with PMH of DM (not on insulin at home), CHALINO presents to the hospital after jaw swelling since 4/7 after root canal. On 4/5 patient had root canal performed and developed swelling for which he received clindamycin as well as ibuprofen for pain control. During this time the patient endorses decreasing po intake due to pain, as well as persistent polyuria/polydipsia and nausea (denies vomitting). The patient was then re-evaluated by oral surgery on 4/12 AM and was told to go to the ED for further evaluation.     In the ED the patient was found to have DKA with a pH of 7.21 as well as an anion gap of 33. The patient was given a IVF bolus as well as started on an insulin drip. The patient was never in the ICU for his diabetes, and denies any prior episodes of DKA. Patient takes metformin 500ER BID at home as well as glyxambi (10-5) daily and has been managed by his PCP Dr. Johnson    The patient was also found to have a developing  phlegmon/abscess in the right platysma and was evaluated by ENT/dental/OMFS. ENT scoped the patient and noted no edema of the vocal cords.

## 2021-04-12 NOTE — PROGRESS NOTE ADULT - SUBJECTIVE AND OBJECTIVE BOX
Please refer to previous dental consult note for additional information.     *INTERVAL HPI/OVERNIGHT EVENTS:    MEDICATIONS  (STANDING):  insulin regular Infusion 9 Unit(s)/Hr (9 mL/Hr) IV Continuous <Continuous>  sodium chloride 0.9% Bolus 2000 milliLiter(s) IV Bolus once  vancomycin  IVPB 1000 milliGRAM(s) IV Intermittent once    MEDICATIONS  (PRN):      Allergies    No Known Allergies    Intolerances    Vital Signs Last 24 Hrs  T(C): 36.3 (12 Apr 2021 11:32), Max: 36.4 (12 Apr 2021 11:04)  T(F): 97.4 (12 Apr 2021 11:32), Max: 97.5 (12 Apr 2021 11:04)  HR: 119 (12 Apr 2021 11:32) (119 - 124)  BP: 135/92 (12 Apr 2021 11:32) (113/86 - 135/92)  BP(mean): --  RR: 16 (12 Apr 2021 11:32) (16 - 18)  SpO2: 100% (12 Apr 2021 11:32) (100% - 100%)    LABS:                        17.7   21.76 )-----------( 312      ( 12 Apr 2021 12:18 )             48.2     04-12    126<L>  |  81<L>  |  96<H>  ----------------------------<  427<H>  4.7   |  12<L>  |  4.55<H>    Ca    9.5      12 Apr 2021 12:18    TPro  8.4<H>  /  Alb  4.3  /  TBili  0.2  /  DBili  x   /  AST  12  /  ALT  15  /  AlkPhos  104  04-12    Platelet Count - Automated: 312 K/uL [150 - 400] (04-12 @ 12:18)  WBC Count: 21.76 K/uL *H* [3.80 - 10.50] (04-12 @ 12:18)  INR: 1.29 ratio *H* [0.88 - 1.16] (04-12 @ 12:18)      CLINICAL EXAM: Patient reports LR swelling that began on 4/3, RCT initiated on tooth #29 on 4/5, worsening in swelling 2 days later, patient saw outside OS earlier today but referred to ER. Panoramic x ray taken and interpreted. #29 PAR noted with extensive distal decay at the level of osseous crest, #27 possible PAR also noted. Extra oral submental cellulitic swelling noted that is tender to touch. Intra oral vestibular swelling noted buccal and lingual to area of tooth #29-#28. CT scan obtained. Oral surgery would like to evaluate swelling and possible extract tooth #29/ I &D.     DENTAL RADIOGRAPHS: Panoramic x ray     ASSESSMENT:     PLAN: transfer to dental clinic for evaluation by Oral Surgery     PROCEDURE:  Verbal  consent given.     RECOMMENDATIONS:  1) Oral Surgery evaluation for possible exo/ I & D  2) F/U with outpatient dentist for comprehensive dental care upon discharge.  3) If swelling, fever, difficulty breathing/swallowing occurs, page Dental.     Jaky Restrepo DDS #[id number], Ela Quiñones DMD #[id number]

## 2021-04-12 NOTE — PROGRESS NOTE ADULT - SUBJECTIVE AND OBJECTIVE BOX
4/3/2020      TELEHEALTH EVALUATION -- Audio/Visual (During [id number] public health emergency)    HPI:    Analy Irene (:  1966) has requested an audio/video evaluation for the following concern(s):      Pt feels he may have had Covid-19 approx 2-3 months ago - had fever up to 102, dry cough, headache, chills (which is odd for him), and diarrhea. Denies SOB. Was sick for 2.5-3 weeks, and then it finally resolved. Pt has not been checking his glucose lately. However, he feels he has been \"crashing\" more often. Sometimes around 1:00-2:00 pm at home, he will crash. He will eat a late breakfast/early lunch, and then a small snack between lunch and dinner, and then dinner. The snack is what will keep him from \"crashing\" by dinner. Will have sweats and shaking, along with thick saliva, and he will get some hard candy or cereal.  States it is happening approx 3-4x's per week. Has been eating more oat-based products based on triglycerides being high on last LP - eating oatmeal approx once or twice per week, and oat-based cereals. Drinking 1 glass of cranberry juice for his kidneys per day and grapefruit juice once daily; then mostly water; has maybe one soda daily.     Taking Januvia 50 mg daily, Metformin XR 5778 mg BID, and Trulicity 2.39 mg once weekly on  for DM - taking compliantly.  Also taking ASA 81 mg nightly.     Taking Lopessor 50 mg BID and Lisinopril-HCTZ 20-25 mg daily for HTN.  Checked BP at home this am - 130/89.       Taking Pepcid 40 mg daily and OTC Prilosec 20 mg nightly - stable; no recent heartburn.      Taking Flexeril 10 mg nightly at bedtime for muscle spasticity; sometimes has to take one during the day as needed.  Also taking Diclofenac 75 mg BID. Pt has been having more pain in b/l hands and b/l shoulders - thought it was related to a home project and he was lifting a door and putting in a ramp. However, it has not resolved.   Having trouble sleeping due to pain; Patient seen and examined, s/p I&D and extraction of infected tooth by dental. Reported improvement in pain. FSG control improved, as well. MN drain in place, plan for follow up with dental Wednesday in house vs outpatient pending glucose control. Airway stable. ENT will s/o. waking him up from a \"dead sleep\". Feels sharp, stabbing, and aching; up to 9/10. States that Diclofenac has not been helping.    Magen Randhawa one fall this winter - slipped on ice; injured his R hip and lower leg. Area still hurts around his knee - feels \"tight\". Review of Systems   Constitutional: Negative for fever. HENT: Negative for sore throat. Respiratory: Negative for cough and shortness of breath. Cardiovascular: Negative for chest pain and palpitations. Musculoskeletal: Positive for arthralgias (L shoulder, b/l hand). Neurological: Negative for dizziness, light-headedness and numbness. Prior to Visit Medications    Medication Sig Taking? Authorizing Provider   predniSONE (DELTASONE) 20 MG tablet Take 2 tabs by mouth daily x 6 days, then 1 tab daily x 4 days. Yes Ac Thakkar, DO   cyclobenzaprine (FLEXERIL) 10 MG tablet TAKE 1 TABLET BY MOUTH TWICE DAILY AS NEEDED Yes Ac Thakkar DO   famotidine (PEPCID) 40 MG tablet TAKE 1 TABLET BY MOUTH EVERY DAY Yes Gilda Thakkar, DO   metFORMIN (GLUCOPHAGE-XR) 500 MG extended release tablet TAKE 2 TABLETS BY MOUTH TWICE DAILY Yes Gilda Thakkar DO   JANUVIA 50 MG tablet TAKE 1 TABLET BY MOUTH DAILY Yes Ac Thakkar, DO   lisinopril-hydrochlorothiazide (PRINZIDE;ZESTORETIC) 20-25 MG per tablet TAKE 1 TABLET BY MOUTH DAILY Yes Ac Thakkar DO   diclofenac (VOLTAREN) 75 MG EC tablet TAKE 1 TABLET BY MOUTH TWICE DAILY WITH FOOD Yes Ac Thakkar DO   metoprolol tartrate (LOPRESSOR) 50 MG tablet TAKE 1 TABLET BY MOUTH TWICE DAILY Yes Gilda Thakkar, DO   TRULICITY 6.34 PO/5.5YL SOPN INJECT 0.75MG INTO THE SKIN ONCE A WEEK Yes Maura Thakkar, DO   ONETOUCH DELICA LANCETS FINE MISC TEST THREE TIMES WEEKLY AS DIRECTED Yes Ac Thakkar, DO   Blood Glucose Monitoring Suppl (ONE TOUCH ULTRA 2) w/Device KIT USE AS DIRECTED Yes Historical Provider, MD   Glucose Blood (BLOOD GLUCOSE TEST STRIPS) STRP TEST THREE TIMES WEEKLY AS DIRECTED.  Please fill per observation)    Blood pressure- 130/89 Heart rate- 84      Constitutional: [x] Appears well-developed and well-nourished [x] No apparent distress      [] Abnormal-   Mental status  [x] Alert and awake  [x] Oriented to person/place/time [x]Able to follow commands      Eyes:  EOM    [x]  Normal  [] Abnormal-  Sclera  [x]  Normal  [] Abnormal -    HENT:   [x] Normocephalic, atraumatic. [] Abnormal     External Ears [x] Normal  [] Abnormal-     Neck: [x] No visualized mass     Pulmonary/Chest: [x] Respiratory effort normal.  [x] No visualized signs of difficulty breathing or respiratory distress        [] Abnormal-      Musculoskeletal:           [] Abnormal- pain with passive ROM of L shoulder, mostly with flexion and abduction; pain noted to be in anterior shoulder by pt      Neurological:        [x] No Facial Asymmetry (Cranial nerve 7 motor function) (limited exam to video visit)               [] Abnormal-         Skin:        [x] No significant exanthematous lesions or discoloration noted on facial skin         [] Abnormal-            Psychiatric:       [x] Normal Affect        [] Abnormal-       ASSESSMENT/PLAN:  1. Type 2 diabetes mellitus without complication, without long-term current use of insulin (HCC)  Will have pt stop taking Januvia 50 mg.      2. Essential hypertension    3. Acute pain of left shoulder  - predniSONE (DELTASONE) 20 MG tablet; Take 2 tabs by mouth daily x 6 days, then 1 tab daily x 4 days. Dispense: 16 tablet; Refill: 0    4. Bilateral hand pain  - predniSONE (DELTASONE) 20 MG tablet; Take 2 tabs by mouth daily x 6 days, then 1 tab daily x 4 days. Dispense: 16 tablet; Refill: 0    5. Mixed hyperlipidemia    6. Cerebral palsy, unspecified type ([facility] [address]    7. Muscle spasticity      Return in about 5 months (around 9/3/2020) for Follow-up DMMarguerite Polanco is a 46 y.o. male being evaluated by a Virtual Visit (video visit) encounter to address concerns as mentioned above.   A caregiver was

## 2021-04-12 NOTE — ED PROVIDER NOTE - PHYSICAL EXAMINATION
General: NAD, good hygiene, well developed  HENT: Atraumatic, EOMI, no conjunctivae injection, moist mucosa  Neck: b/l neck swelling mostly in submandibular area, swelling under the base of the tongue. no dysplasia, odynophagia, trachea midline   Cardiovascular: tachycardiac, S1&2, no M or R, radial pulses equal and b/l  Respiratory: CTABL, no wheezes or crackles, no decreased breath sounds  Abdominal: soft and non-tender non distended, neg for guarding, no CVA tenderness   Extremities: no edema of the legs/feet, DP/PT equal b/l  Skin: warm, well perfused  Neurologic: nonfocal, AAOx3  Psych: normal mood and affect General: NAD, good hygiene, well developed  HENT: Atraumatic, EOMI, no conjunctivae injection, moist mucosa  Neck: b/l neck swelling mostly in submandibular area, swelling under the base of the tongue. no dysplasia, odynophagia, trachea midline   Cardiovascular: tachycardiac, S1&2, no M or R, radial pulses equal and b/l  Respiratory: CTABL, no stridor, wheezes or crackles, no decreased breath sounds  Abdominal: soft and non-tender non distended, neg for guarding, no CVA tenderness   Extremities: no edema of the legs/feet, DP/PT equal b/l  Skin: warm, well perfused  Neurologic: nonfocal, AAOx3  Psych: normal mood and affect

## 2021-04-12 NOTE — H&P ADULT - NSHPREVIEWOFSYSTEMS_GEN_ALL_CORE
REVIEW OF SYSTEMS:    CONSTITUTIONAL: No weakness, fevers or chills  EYES/ENT: No visual changes;  No vertigo or throat pain. Endorses mandibular swelling/pain  RESPIRATORY: No cough, wheezing, hemoptysis; No shortness of breath  CARDIOVASCULAR: No chest pain or palpitations  GASTROINTESTINAL: No abdominal or epigastric pain. No nausea, vomiting, or hematemesis; No diarrhea or constipation. No melena or hematochezia.  GENITOURINARY: No dysuria, frequency or hematuria  NEUROLOGICAL: No numbness or weakness  SKIN: No itching, burning, rashes, or lesions   All other review of systems is negative unless indicated above.

## 2021-04-12 NOTE — ED PROVIDER NOTE - OBJECTIVE STATEMENT
h.o DM (metformin), p.w shortness of breath and neck swelling after root canal on the right lower tooth 5 days ago. patient states the neck swelling and toothache started 2 wks ago and went to see the oral surgeon who performed the root canal but the swelling worsened since. patient was seen by his oral surgeon Dr. Dowell this am and was told to come in for eval. patient is taking clindamycin at home.

## 2021-04-13 DIAGNOSIS — E11.10 TYPE 2 DIABETES MELLITUS WITH KETOACIDOSIS WITHOUT COMA: ICD-10-CM

## 2021-04-13 DIAGNOSIS — I10 ESSENTIAL (PRIMARY) HYPERTENSION: ICD-10-CM

## 2021-04-13 DIAGNOSIS — E78.5 HYPERLIPIDEMIA, UNSPECIFIED: ICD-10-CM

## 2021-04-13 LAB
A1C WITH ESTIMATED AVERAGE GLUCOSE RESULT: 8.2 % — HIGH (ref 4–5.6)
ALBUMIN SERPL ELPH-MCNC: 3.3 G/DL — SIGNIFICANT CHANGE UP (ref 3.3–5)
ALP SERPL-CCNC: 81 U/L — SIGNIFICANT CHANGE UP (ref 40–120)
ALT FLD-CCNC: 12 U/L — SIGNIFICANT CHANGE UP (ref 4–41)
ANION GAP SERPL CALC-SCNC: 14 MMOL/L — SIGNIFICANT CHANGE UP (ref 7–14)
ANION GAP SERPL CALC-SCNC: 16 MMOL/L — HIGH (ref 7–14)
ANION GAP SERPL CALC-SCNC: 19 MMOL/L — HIGH (ref 7–14)
ANION GAP SERPL CALC-SCNC: 22 MMOL/L — HIGH (ref 7–14)
ANION GAP SERPL CALC-SCNC: 24 MMOL/L — HIGH (ref 7–14)
APTT BLD: 32.2 SEC — SIGNIFICANT CHANGE UP (ref 27–36.3)
APTT BLD: 38.4 SEC — HIGH (ref 27–36.3)
AST SERPL-CCNC: 15 U/L — SIGNIFICANT CHANGE UP (ref 4–40)
B-OH-BUTYR SERPL-SCNC: 0.3 MMOL/L — SIGNIFICANT CHANGE UP (ref 0–0.4)
B-OH-BUTYR SERPL-SCNC: <0 MMOL/L — SIGNIFICANT CHANGE UP (ref 0–0.4)
B-OH-BUTYR SERPL-SCNC: <0 MMOL/L — SIGNIFICANT CHANGE UP (ref 0–0.4)
BASE EXCESS BLDV CALC-SCNC: -9.6 MMOL/L — LOW (ref -3–2)
BASOPHILS # BLD AUTO: 0.06 K/UL — SIGNIFICANT CHANGE UP (ref 0–0.2)
BASOPHILS NFR BLD AUTO: 0.3 % — SIGNIFICANT CHANGE UP (ref 0–2)
BILIRUB SERPL-MCNC: 0.4 MG/DL — SIGNIFICANT CHANGE UP (ref 0.2–1.2)
BLOOD GAS VENOUS - CREATININE: SIGNIFICANT CHANGE UP MG/DL (ref 0.5–1.3)
BLOOD GAS VENOUS COMPREHENSIVE RESULT: SIGNIFICANT CHANGE UP
BUN SERPL-MCNC: 76 MG/DL — HIGH (ref 7–23)
BUN SERPL-MCNC: 81 MG/DL — HIGH (ref 7–23)
BUN SERPL-MCNC: 90 MG/DL — HIGH (ref 7–23)
BUN SERPL-MCNC: 96 MG/DL — HIGH (ref 7–23)
BUN SERPL-MCNC: 98 MG/DL — HIGH (ref 7–23)
CALCIUM SERPL-MCNC: 8.3 MG/DL — LOW (ref 8.4–10.5)
CALCIUM SERPL-MCNC: 8.3 MG/DL — LOW (ref 8.4–10.5)
CALCIUM SERPL-MCNC: 8.4 MG/DL — SIGNIFICANT CHANGE UP (ref 8.4–10.5)
CALCIUM SERPL-MCNC: 8.4 MG/DL — SIGNIFICANT CHANGE UP (ref 8.4–10.5)
CALCIUM SERPL-MCNC: 8.5 MG/DL — SIGNIFICANT CHANGE UP (ref 8.4–10.5)
CHLORIDE BLDV-SCNC: 109 MMOL/L — HIGH (ref 96–108)
CHLORIDE SERPL-SCNC: 100 MMOL/L — SIGNIFICANT CHANGE UP (ref 98–107)
CHLORIDE SERPL-SCNC: 104 MMOL/L — SIGNIFICANT CHANGE UP (ref 98–107)
CHLORIDE SERPL-SCNC: 106 MMOL/L — SIGNIFICANT CHANGE UP (ref 98–107)
CHLORIDE SERPL-SCNC: 94 MMOL/L — LOW (ref 98–107)
CHLORIDE SERPL-SCNC: 95 MMOL/L — LOW (ref 98–107)
CHOLEST SERPL-MCNC: 172 MG/DL — SIGNIFICANT CHANGE UP
CO2 SERPL-SCNC: 13 MMOL/L — LOW (ref 22–31)
CO2 SERPL-SCNC: 15 MMOL/L — LOW (ref 22–31)
CO2 SERPL-SCNC: 15 MMOL/L — LOW (ref 22–31)
CO2 SERPL-SCNC: 17 MMOL/L — LOW (ref 22–31)
CO2 SERPL-SCNC: 18 MMOL/L — LOW (ref 22–31)
CREAT SERPL-MCNC: 2.13 MG/DL — HIGH (ref 0.5–1.3)
CREAT SERPL-MCNC: 2.41 MG/DL — HIGH (ref 0.5–1.3)
CREAT SERPL-MCNC: 2.81 MG/DL — HIGH (ref 0.5–1.3)
CREAT SERPL-MCNC: 3.46 MG/DL — HIGH (ref 0.5–1.3)
CREAT SERPL-MCNC: 3.8 MG/DL — HIGH (ref 0.5–1.3)
CRP SERPL-MCNC: 94.8 MG/L — HIGH
EOSINOPHIL # BLD AUTO: 0.26 K/UL — SIGNIFICANT CHANGE UP (ref 0–0.5)
EOSINOPHIL NFR BLD AUTO: 1.4 % — SIGNIFICANT CHANGE UP (ref 0–6)
ERYTHROCYTE [SEDIMENTATION RATE] IN BLOOD: 86 MM/HR — HIGH (ref 1–15)
ESTIMATED AVERAGE GLUCOSE: 189 MG/DL — HIGH (ref 68–114)
GAS PNL BLDV: 133 MMOL/L — LOW (ref 136–146)
GLUCOSE BLDC GLUCOMTR-MCNC: 104 MG/DL — HIGH (ref 70–99)
GLUCOSE BLDC GLUCOMTR-MCNC: 123 MG/DL — HIGH (ref 70–99)
GLUCOSE BLDC GLUCOMTR-MCNC: 123 MG/DL — HIGH (ref 70–99)
GLUCOSE BLDC GLUCOMTR-MCNC: 131 MG/DL — HIGH (ref 70–99)
GLUCOSE BLDC GLUCOMTR-MCNC: 134 MG/DL — HIGH (ref 70–99)
GLUCOSE BLDC GLUCOMTR-MCNC: 136 MG/DL — HIGH (ref 70–99)
GLUCOSE BLDC GLUCOMTR-MCNC: 140 MG/DL — HIGH (ref 70–99)
GLUCOSE BLDC GLUCOMTR-MCNC: 154 MG/DL — HIGH (ref 70–99)
GLUCOSE BLDC GLUCOMTR-MCNC: 169 MG/DL — HIGH (ref 70–99)
GLUCOSE BLDC GLUCOMTR-MCNC: 169 MG/DL — HIGH (ref 70–99)
GLUCOSE BLDC GLUCOMTR-MCNC: 176 MG/DL — HIGH (ref 70–99)
GLUCOSE BLDC GLUCOMTR-MCNC: 199 MG/DL — HIGH (ref 70–99)
GLUCOSE BLDC GLUCOMTR-MCNC: 208 MG/DL — HIGH (ref 70–99)
GLUCOSE BLDC GLUCOMTR-MCNC: 211 MG/DL — HIGH (ref 70–99)
GLUCOSE BLDC GLUCOMTR-MCNC: 222 MG/DL — HIGH (ref 70–99)
GLUCOSE BLDC GLUCOMTR-MCNC: 222 MG/DL — HIGH (ref 70–99)
GLUCOSE BLDC GLUCOMTR-MCNC: 225 MG/DL — HIGH (ref 70–99)
GLUCOSE BLDC GLUCOMTR-MCNC: 230 MG/DL — HIGH (ref 70–99)
GLUCOSE BLDC GLUCOMTR-MCNC: 237 MG/DL — HIGH (ref 70–99)
GLUCOSE BLDC GLUCOMTR-MCNC: 279 MG/DL — HIGH (ref 70–99)
GLUCOSE BLDC GLUCOMTR-MCNC: 86 MG/DL — SIGNIFICANT CHANGE UP (ref 70–99)
GLUCOSE BLDV-MCNC: 179 MG/DL — HIGH (ref 70–99)
GLUCOSE SERPL-MCNC: 131 MG/DL — HIGH (ref 70–99)
GLUCOSE SERPL-MCNC: 142 MG/DL — HIGH (ref 70–99)
GLUCOSE SERPL-MCNC: 179 MG/DL — HIGH (ref 70–99)
GLUCOSE SERPL-MCNC: 254 MG/DL — HIGH (ref 70–99)
GLUCOSE SERPL-MCNC: 80 MG/DL — SIGNIFICANT CHANGE UP (ref 70–99)
HCO3 BLDV-SCNC: 16 MMOL/L — LOW (ref 20–27)
HCT VFR BLD CALC: 43.7 % — SIGNIFICANT CHANGE UP (ref 39–50)
HCT VFR BLDA CALC: 45 % — SIGNIFICANT CHANGE UP (ref 39–51)
HDLC SERPL-MCNC: 36 MG/DL — LOW
HGB BLD CALC-MCNC: 14.7 G/DL — SIGNIFICANT CHANGE UP (ref 13–17)
HGB BLD-MCNC: 16.1 G/DL — SIGNIFICANT CHANGE UP (ref 13–17)
IANC: 14.68 K/UL — HIGH (ref 1.5–8.5)
IMM GRANULOCYTES NFR BLD AUTO: 0.5 % — SIGNIFICANT CHANGE UP (ref 0–1.5)
INR BLD: 1.25 RATIO — HIGH (ref 0.88–1.16)
INR BLD: 1.31 RATIO — HIGH (ref 0.88–1.16)
LACTATE BLDV-MCNC: 2.2 MMOL/L — HIGH (ref 0.5–2)
LACTATE SERPL-SCNC: 1.6 MMOL/L — SIGNIFICANT CHANGE UP (ref 0.5–2)
LIPID PNL WITH DIRECT LDL SERPL: 104 MG/DL — HIGH
LYMPHOCYTES # BLD AUTO: 1.05 K/UL — SIGNIFICANT CHANGE UP (ref 1–3.3)
LYMPHOCYTES # BLD AUTO: 5.6 % — LOW (ref 13–44)
MAGNESIUM SERPL-MCNC: 2.8 MG/DL — HIGH (ref 1.6–2.6)
MAGNESIUM SERPL-MCNC: 2.9 MG/DL — HIGH (ref 1.6–2.6)
MAGNESIUM SERPL-MCNC: 2.9 MG/DL — HIGH (ref 1.6–2.6)
MAGNESIUM SERPL-MCNC: 3 MG/DL — HIGH (ref 1.6–2.6)
MAGNESIUM SERPL-MCNC: 3.1 MG/DL — HIGH (ref 1.6–2.6)
MCHC RBC-ENTMCNC: 34.7 PG — HIGH (ref 27–34)
MCHC RBC-ENTMCNC: 36.8 GM/DL — HIGH (ref 32–36)
MCV RBC AUTO: 94.2 FL — SIGNIFICANT CHANGE UP (ref 80–100)
MONOCYTES # BLD AUTO: 2.7 K/UL — HIGH (ref 0–0.9)
MONOCYTES NFR BLD AUTO: 14.3 % — HIGH (ref 2–14)
NEUTROPHILS # BLD AUTO: 14.68 K/UL — HIGH (ref 1.8–7.4)
NEUTROPHILS NFR BLD AUTO: 77.9 % — HIGH (ref 43–77)
NON HDL CHOLESTEROL: 136 MG/DL — HIGH
NRBC # BLD: 0 /100 WBCS — SIGNIFICANT CHANGE UP
NRBC # FLD: 0 K/UL — SIGNIFICANT CHANGE UP
PCO2 BLDV: 36 MMHG — LOW (ref 42–55)
PH BLDV: 7.27 — LOW (ref 7.32–7.43)
PHOSPHATE SERPL-MCNC: 3.4 MG/DL — SIGNIFICANT CHANGE UP (ref 2.5–4.5)
PHOSPHATE SERPL-MCNC: 3.7 MG/DL — SIGNIFICANT CHANGE UP (ref 2.5–4.5)
PHOSPHATE SERPL-MCNC: 4.2 MG/DL — SIGNIFICANT CHANGE UP (ref 2.5–4.5)
PHOSPHATE SERPL-MCNC: 6.1 MG/DL — HIGH (ref 2.5–4.5)
PHOSPHATE SERPL-MCNC: 6.9 MG/DL — HIGH (ref 2.5–4.5)
PLATELET # BLD AUTO: 283 K/UL — SIGNIFICANT CHANGE UP (ref 150–400)
PO2 BLDV: 45 MMHG — HIGH (ref 35–40)
POTASSIUM BLDV-SCNC: 3.6 MMOL/L — SIGNIFICANT CHANGE UP (ref 3.4–4.5)
POTASSIUM SERPL-MCNC: 3.7 MMOL/L — SIGNIFICANT CHANGE UP (ref 3.5–5.3)
POTASSIUM SERPL-MCNC: 3.8 MMOL/L — SIGNIFICANT CHANGE UP (ref 3.5–5.3)
POTASSIUM SERPL-MCNC: 3.8 MMOL/L — SIGNIFICANT CHANGE UP (ref 3.5–5.3)
POTASSIUM SERPL-MCNC: 3.9 MMOL/L — SIGNIFICANT CHANGE UP (ref 3.5–5.3)
POTASSIUM SERPL-MCNC: 4.1 MMOL/L — SIGNIFICANT CHANGE UP (ref 3.5–5.3)
POTASSIUM SERPL-SCNC: 3.7 MMOL/L — SIGNIFICANT CHANGE UP (ref 3.5–5.3)
POTASSIUM SERPL-SCNC: 3.8 MMOL/L — SIGNIFICANT CHANGE UP (ref 3.5–5.3)
POTASSIUM SERPL-SCNC: 3.8 MMOL/L — SIGNIFICANT CHANGE UP (ref 3.5–5.3)
POTASSIUM SERPL-SCNC: 3.9 MMOL/L — SIGNIFICANT CHANGE UP (ref 3.5–5.3)
POTASSIUM SERPL-SCNC: 4.1 MMOL/L — SIGNIFICANT CHANGE UP (ref 3.5–5.3)
PROCALCITONIN SERPL-MCNC: 0.48 NG/ML — HIGH (ref 0.02–0.1)
PROT SERPL-MCNC: 6.9 G/DL — SIGNIFICANT CHANGE UP (ref 6–8.3)
PROTHROM AB SERPL-ACNC: 14.1 SEC — HIGH (ref 10.6–13.6)
PROTHROM AB SERPL-ACNC: 14.9 SEC — HIGH (ref 10.6–13.6)
RBC # BLD: 4.64 M/UL — SIGNIFICANT CHANGE UP (ref 4.2–5.8)
RBC # FLD: 11.7 % — SIGNIFICANT CHANGE UP (ref 10.3–14.5)
SAO2 % BLDV: 77.6 % — SIGNIFICANT CHANGE UP (ref 60–85)
SODIUM SERPL-SCNC: 131 MMOL/L — LOW (ref 135–145)
SODIUM SERPL-SCNC: 132 MMOL/L — LOW (ref 135–145)
SODIUM SERPL-SCNC: 134 MMOL/L — LOW (ref 135–145)
SODIUM SERPL-SCNC: 137 MMOL/L — SIGNIFICANT CHANGE UP (ref 135–145)
SODIUM SERPL-SCNC: 138 MMOL/L — SIGNIFICANT CHANGE UP (ref 135–145)
TRIGL SERPL-MCNC: 158 MG/DL — HIGH
WBC # BLD: 18.84 K/UL — HIGH (ref 3.8–10.5)
WBC # FLD AUTO: 18.84 K/UL — HIGH (ref 3.8–10.5)

## 2021-04-13 PROCEDURE — 99291 CRITICAL CARE FIRST HOUR: CPT

## 2021-04-13 PROCEDURE — 99223 1ST HOSP IP/OBS HIGH 75: CPT

## 2021-04-13 RX ORDER — HEPARIN SODIUM 5000 [USP'U]/ML
5000 INJECTION INTRAVENOUS; SUBCUTANEOUS EVERY 8 HOURS
Refills: 0 | Status: DISCONTINUED | OUTPATIENT
Start: 2021-04-13 | End: 2021-04-16

## 2021-04-13 RX ORDER — POTASSIUM CHLORIDE 20 MEQ
20 PACKET (EA) ORAL ONCE
Refills: 0 | Status: COMPLETED | OUTPATIENT
Start: 2021-04-13 | End: 2021-04-13

## 2021-04-13 RX ORDER — POTASSIUM CHLORIDE 20 MEQ
20 PACKET (EA) ORAL ONCE
Refills: 0 | Status: DISCONTINUED | OUTPATIENT
Start: 2021-04-13 | End: 2021-04-13

## 2021-04-13 RX ORDER — DEXTROSE MONOHYDRATE, SODIUM CHLORIDE, AND POTASSIUM CHLORIDE 50; .745; 4.5 G/1000ML; G/1000ML; G/1000ML
1000 INJECTION, SOLUTION INTRAVENOUS
Refills: 0 | Status: DISCONTINUED | OUTPATIENT
Start: 2021-04-13 | End: 2021-04-14

## 2021-04-13 RX ADMIN — HEPARIN SODIUM 5000 UNIT(S): 5000 INJECTION INTRAVENOUS; SUBCUTANEOUS at 13:50

## 2021-04-13 RX ADMIN — HYDROMORPHONE HYDROCHLORIDE 0.5 MILLIGRAM(S): 2 INJECTION INTRAMUSCULAR; INTRAVENOUS; SUBCUTANEOUS at 01:56

## 2021-04-13 RX ADMIN — HYDROMORPHONE HYDROCHLORIDE 1 MILLIGRAM(S): 2 INJECTION INTRAMUSCULAR; INTRAVENOUS; SUBCUTANEOUS at 16:15

## 2021-04-13 RX ADMIN — DEXTROSE MONOHYDRATE, SODIUM CHLORIDE, AND POTASSIUM CHLORIDE 150 MILLILITER(S): 50; .745; 4.5 INJECTION, SOLUTION INTRAVENOUS at 03:16

## 2021-04-13 RX ADMIN — HYDROMORPHONE HYDROCHLORIDE 1 MILLIGRAM(S): 2 INJECTION INTRAMUSCULAR; INTRAVENOUS; SUBCUTANEOUS at 08:00

## 2021-04-13 RX ADMIN — HYDROMORPHONE HYDROCHLORIDE 1 MILLIGRAM(S): 2 INJECTION INTRAMUSCULAR; INTRAVENOUS; SUBCUTANEOUS at 15:19

## 2021-04-13 RX ADMIN — PIPERACILLIN AND TAZOBACTAM 25 GRAM(S): 4; .5 INJECTION, POWDER, LYOPHILIZED, FOR SOLUTION INTRAVENOUS at 06:31

## 2021-04-13 RX ADMIN — HYDROMORPHONE HYDROCHLORIDE 1 MILLIGRAM(S): 2 INJECTION INTRAMUSCULAR; INTRAVENOUS; SUBCUTANEOUS at 07:21

## 2021-04-13 RX ADMIN — CHLORHEXIDINE GLUCONATE 1 APPLICATION(S): 213 SOLUTION TOPICAL at 06:30

## 2021-04-13 RX ADMIN — Medication 20 MILLIEQUIVALENT(S): at 01:13

## 2021-04-13 RX ADMIN — HYDROMORPHONE HYDROCHLORIDE 0.5 MILLIGRAM(S): 2 INJECTION INTRAMUSCULAR; INTRAVENOUS; SUBCUTANEOUS at 10:57

## 2021-04-13 RX ADMIN — DEXTROSE MONOHYDRATE, SODIUM CHLORIDE, AND POTASSIUM CHLORIDE 200 MILLILITER(S): 50; .745; 4.5 INJECTION, SOLUTION INTRAVENOUS at 22:47

## 2021-04-13 RX ADMIN — HEPARIN SODIUM 5000 UNIT(S): 5000 INJECTION INTRAVENOUS; SUBCUTANEOUS at 22:47

## 2021-04-13 RX ADMIN — HYDROMORPHONE HYDROCHLORIDE 0.5 MILLIGRAM(S): 2 INJECTION INTRAMUSCULAR; INTRAVENOUS; SUBCUTANEOUS at 01:21

## 2021-04-13 RX ADMIN — HYDROMORPHONE HYDROCHLORIDE 1 MILLIGRAM(S): 2 INJECTION INTRAMUSCULAR; INTRAVENOUS; SUBCUTANEOUS at 21:30

## 2021-04-13 RX ADMIN — HYDROMORPHONE HYDROCHLORIDE 1 MILLIGRAM(S): 2 INJECTION INTRAMUSCULAR; INTRAVENOUS; SUBCUTANEOUS at 22:08

## 2021-04-13 RX ADMIN — HYDROMORPHONE HYDROCHLORIDE 0.5 MILLIGRAM(S): 2 INJECTION INTRAMUSCULAR; INTRAVENOUS; SUBCUTANEOUS at 13:40

## 2021-04-13 RX ADMIN — PIPERACILLIN AND TAZOBACTAM 25 GRAM(S): 4; .5 INJECTION, POWDER, LYOPHILIZED, FOR SOLUTION INTRAVENOUS at 18:30

## 2021-04-13 NOTE — CONSULT NOTE ADULT - ASSESSMENT
46 yr old M with Type 2 DM uncontrolled A1C 8.2 here with submandibular abscess and found to be in DKA.

## 2021-04-13 NOTE — PROGRESS NOTE ADULT - SUBJECTIVE AND OBJECTIVE BOX
PATIENT:  RAUL ALLEN  [id number] COMPLAINT:  Patient is a 46y old  Male who presents with a chief complaint of Facial Abscess and DKA (13 Apr 2021 08:14)    INTERVAL HISTORYOVERNIGHT EVENTS:  BASSEM overnight. Gap now closed     REVIEW OF SYSTEMS:    MEDICATIONS:  MEDICATIONS  (STANDING):  chlorhexidine 4% Liquid 1 Application(s) Topical <User Schedule>  dextrose 40% Gel 15 Gram(s) Oral once  dextrose 5% + lactated ringers with potassium chloride 40 mEq/L 1000 milliLiter(s) (200 mL/Hr) IV Continuous <Continuous>  dextrose 5%. 1000 milliLiter(s) (50 mL/Hr) IV Continuous <Continuous>  dextrose 5%. 1000 milliLiter(s) (100 mL/Hr) IV Continuous <Continuous>  dextrose 50% Injectable 25 Gram(s) IV Push once  dextrose 50% Injectable 12.5 Gram(s) IV Push once  dextrose 50% Injectable 25 Gram(s) IV Push once  glucagon  Injectable 1 milliGRAM(s) IntraMuscular once  heparin   Injectable 5000 Unit(s) SubCutaneous every 8 hours  insulin lispro (ADMELOG) corrective regimen sliding scale   SubCutaneous three times a day before meals  insulin lispro (ADMELOG) corrective regimen sliding scale   SubCutaneous at bedtime  insulin lispro Injectable (ADMELOG) 8 Unit(s) SubCutaneous three times a day before meals  insulin regular Infusion 5 Unit(s)/Hr (5 mL/Hr) IV Continuous <Continuous>  piperacillin/tazobactam IVPB.. 3.375 Gram(s) IV Intermittent every 8 hours    MEDICATIONS  (PRN):  HYDROmorphone  Injectable 0.5 milliGRAM(s) IV Push every 4 hours PRN Moderate Pain (4 - 6)  HYDROmorphone  Injectable 1 milliGRAM(s) IV Push every 4 hours PRN Severe Pain (7 - 10)      ALLERGIES:  Allergies    No Known Allergies    Intolerances      OBJECTIVE:  ICU Vital Signs Last 24 Hrs  T(C): 35.8 (14 Apr 2021 12:00), Max: 36.9 (13 Apr 2021 19:00)  T(F): 96.4 (14 Apr 2021 12:00), Max: 98.4 (13 Apr 2021 19:00)  HR: 74 (14 Apr 2021 12:00) (74 - 106)  BP: 128/78 (14 Apr 2021 12:00) (111/80 - 140/91)  BP(mean): 94 (14 Apr 2021 12:00) (86 - 106)  RR: 15 (14 Apr 2021 12:00) (10 - 21)  SpO2: 100% (14 Apr 2021 12:00) (91% - 100%)    POCT Blood Glucose.: 134 mg/dL (13 Apr 2021 19:54)  POCT Blood Glucose.: 123 mg/dL (13 Apr 2021 18:15)  POCT Blood Glucose.: 104 mg/dL (13 Apr 2021 17:23)  POCT Blood Glucose.: 86 mg/dL (13 Apr 2021 17:19)  POCT Blood Glucose.: 154 mg/dL (13 Apr 2021 16:05)  POCT Blood Glucose.: 140 mg/dL (13 Apr 2021 15:03)  POCT Blood Glucose.: 169 mg/dL (13 Apr 2021 13:52)    CAPILLARY BLOOD GLUCOSE      POCT Blood Glucose.: 150 mg/dL (14 Apr 2021 11:46)    I&O's Summary    13 Apr 2021 07:01  -  14 Apr 2021 07:00  --------------------------------------------------------  IN: 4379 mL / OUT: 2825 mL / NET: 1554 mL      Daily     Daily     PHYSICAL EXAMINATION:  General: WN/WD NAD  HEENT: PERRLA, EOMI, moist mucous membranes  Neurology: A&Ox3, nonfocal, CHAPMAN x 4  Respiratory: CTA B/L, normal respiratory effort, no wheezes, crackles, rales  CV: RRR, S1S2, no murmurs, rubs or gallops  Abdominal: Soft, NT, ND +BS, Last BM  Extremities: No edema, + peripheral pulses    LABS:                          14.7   12.44 )-----------( 313      ( 14 Apr 2021 02:55 )             40.6     04-14    139  |  110<H>  |  63<H>  ----------------------------<  170<H>  4.0   |  21<L>  |  1.29    Ca    8.7      14 Apr 2021 09:31  Phos  2.6     04-14  Mg     2.8     04-14    TPro  7.1  /  Alb  3.3  /  TBili  0.4  /  DBili  x   /  AST  15  /  ALT  10  /  AlkPhos  82  04-14    LIVER FUNCTIONS - ( 14 Apr 2021 02:55 )  Alb: 3.3 g/dL / Pro: 7.1 g/dL / ALK PHOS: 82 U/L / ALT: 10 U/L / AST: 15 U/L / GGT: x           PT/INR - ( 14 Apr 2021 02:55 )   PT: 15.4 sec;   INR: 1.37 ratio         PTT - ( 14 Apr 2021 02:55 )  PTT:44.9 sec  Creatine Kinase, Serum: 16 U/L (04-14 @ 02:55)    CARDIAC MARKERS ( 14 Apr 2021 02:55 )  x     / x     / 16 U/L / x     / x

## 2021-04-13 NOTE — DIETITIAN NUTRITION RISK NOTIFICATION - TREATMENT: THE FOLLOWING DIET HAS BEEN RECOMMENDED
Diet, NPO:   Except Medications     Special Instructions for Nursing:  Except Medications (04-12-21 @ 18:09) [Active]

## 2021-04-13 NOTE — CONSULT NOTE ADULT - SUBJECTIVE AND OBJECTIVE BOX
HPI:  46 y.o M with PMH of DM (not on insulin at home), CHALINO presents to the hospital after jaw swelling since 4/7 after root canal. On 4/5 patient had root canal performed and developed swelling for which he received clindamycin as well as ibuprofen for pain control. During this time the patient endorses decreasing po intake due to pain, as well as persistent polyuria/polydipsia and nausea (denies vomitting). The patient was then re-evaluated by oral surgery on 4/12 AM and was told to go to the ED for further evaluation.     In the ED the patient was found to have DKA with a pH of 7.21 as well as an anion gap of 33. The patient was given a IVF bolus as well as started on an insulin drip. The patient was never in the ICU for his diabetes, and denies any prior episodes of DKA. Patient takes metformin 500ER BID at home as well as glyxambi (10-5) daily and has been managed by his PCP Dr. Johnson    The patient was also found to have a developing  phlegmon/abscess in the right platysma and was evaluated by ENT/dental/OMFS. ENT scoped the patient and noted no edema of the vocal cords.  (12 Apr 2021 15:08)      PAST MEDICAL & SURGICAL HISTORY:  DM (diabetes mellitus)    Atopic dermatitis        FAMILY HISTORY:  Family hx of melanoma (Father)        Social History:    Outpatient Medications:    MEDICATIONS  (STANDING):  chlorhexidine 4% Liquid 1 Application(s) Topical <User Schedule>  dextrose 5% + lactated ringers with potassium chloride 40 mEq/L 1000 milliLiter(s) (150 mL/Hr) IV Continuous <Continuous>  insulin regular Infusion 5.5 Unit(s)/Hr (5.5 mL/Hr) IV Continuous <Continuous>  piperacillin/tazobactam IVPB.. 3.375 Gram(s) IV Intermittent every 12 hours    MEDICATIONS  (PRN):  HYDROmorphone  Injectable 0.5 milliGRAM(s) IV Push every 4 hours PRN Moderate Pain (4 - 6)  HYDROmorphone  Injectable 1 milliGRAM(s) IV Push every 4 hours PRN Severe Pain (7 - 10)      Allergies    No Known Allergies    Intolerances      Review of Systems:  Constitutional: No fever  Eyes: No blurry vision  Neuro: No tremors  HEENT: No pain  Cardiovascular: No chest pain, palpitations  Respiratory: No SOB, no cough  GI: No nausea, vomiting, abdominal pain  : No dysuria  Skin: no rash  Psych: no depression  Endocrine: no polyuria, polydipsia  Hem/lymph: no swelling  Osteoporosis: no fractures    ALL OTHER SYSTEMS REVIEWED AND NEGATIVE    UNABLE TO OBTAIN    PHYSICAL EXAM:  VITALS: T(C): 37 (04-13-21 @ 08:00)  T(F): 98.6 (04-13-21 @ 08:00), Max: 98.6 (04-13-21 @ 08:00)  HR: 95 (04-13-21 @ 09:00) (95 - 124)  BP: 123/64 (04-13-21 @ 09:00) (113/86 - 142/65)  RR:  (13 - 19)  SpO2:  (94% - 100%)  Wt(kg): --  GENERAL: NAD, well-groomed, well-developed  EYES: No proptosis, no lid lag, anicteric  HEENT:  Atraumatic, Normocephalic, moist mucous membranes  THYROID: Normal size, no palpable nodules  RESPIRATORY: Clear to auscultation bilaterally; No rales, rhonchi, wheezing, or rubs  CARDIOVASCULAR: Regular rate and rhythm; No murmurs; no peripheral edema  GI: Soft, nontender, non distended, normal bowel sounds  SKIN: Dry, intact, No rashes or lesions  MUSCULOSKELETAL: Full range of motion, normal strength  NEURO: sensation intact, extraocular movements intact, no tremor, normal reflexes  PSYCH: Alert and oriented x 3, normal affect, normal mood  CUSHING'S SIGNS: no striae    POCT Blood Glucose.: 208 mg/dL (04-13-21 @ 10:04)  POCT Blood Glucose.: 211 mg/dL (04-13-21 @ 09:01)  POCT Blood Glucose.: 222 mg/dL (04-13-21 @ 08:02)  POCT Blood Glucose.: 279 mg/dL (04-13-21 @ 06:32)  POCT Blood Glucose.: 230 mg/dL (04-13-21 @ 05:27)  POCT Blood Glucose.: 237 mg/dL (04-13-21 @ 04:25)  POCT Blood Glucose.: 222 mg/dL (04-13-21 @ 03:14)  POCT Blood Glucose.: 199 mg/dL (04-13-21 @ 02:10)  POCT Blood Glucose.: 169 mg/dL (04-13-21 @ 01:17)  POCT Blood Glucose.: 123 mg/dL (04-13-21 @ 00:08)  POCT Blood Glucose.: 135 mg/dL (04-12-21 @ 23:16)  POCT Blood Glucose.: 150 mg/dL (04-12-21 @ 22:08)  POCT Blood Glucose.: 159 mg/dL (04-12-21 @ 21:16)  POCT Blood Glucose.: 222 mg/dL (04-12-21 @ 19:50)  POCT Blood Glucose.: 261 mg/dL (04-12-21 @ 18:48)  POCT Blood Glucose.: 409 mg/dL (04-12-21 @ 17:42)  POCT Blood Glucose.: 459 mg/dL (04-12-21 @ 16:29)                            16.1   18.84 )-----------( 283      ( 12 Apr 2021 23:45 )             43.7       04-13    132<L>  |  95<L>  |  96<H>  ----------------------------<  254<H>  4.1   |  15<L>  |  3.46<H>    EGFR if : 23<L>  EGFR if non : 20<L>    Ca    8.3<L>      04-13  Mg     2.9     04-13  Phos  6.1     04-13    TPro  8.4<H>  /  Alb  4.3  /  TBili  0.2  /  DBili  x   /  AST  12  /  ALT  15  /  AlkPhos  104  04-12      Thyroid Function Tests:          04-12 Chol 172 LDL -- HDL 36<L> Trig 158<H>    Radiology:                  HPI:  46 y.o M with PMH of DM (not on insulin at home), HLD presents to the hospital after jaw swelling since 4/7 after root canal. On 4/5 patient had root canal performed and developed swelling for which he received clindamycin as well as ibuprofen for pain control. During this time the patient endorses decreasing po intake due to pain, as well as persistent polyuria/polydipsia and nausea (denies vomitting). The patient was then re-evaluated by oral surgery on 4/12 AM and was told to go to the ED for further evaluation.     In the ED the patient was found to have DKA with a pH of 7.21 as well as an anion gap of 33. The patient was given a IVF bolus as well as started on an insulin drip. The patient was never in the ICU for his diabetes, and denies any prior episodes of DKA. Patient takes metformin 500ER BID at home as well as glyxambi (10-5) daily and has been managed by his PCP Dr. Johnson    The patient was also found to have a developing  phlegmon/abscess in the right platysma and was evaluated by ENT/dental/OMFS. ENT scoped the patient and noted no edema of the vocal cords.  (12 Apr 2021 15:08)        Endocrine History: 46 yr old M with Type 2 DM uncontrolled A1C 8.2 here with submandibular abscess and found to be in DKA with Glucose 400s AG 33 pH 7.21. Patient follows with PMD for diabetes management. He was diagnosed with DM2 7 years ago. Has been on a regimen of metformin ER 500mg BID and glyxambi (empagliflozin/linagliptin) 10-5 1 X daily. Has been on latter medication for about 4 years. Has FH of DM in mother. No previous hospitalizations for DKA. Has high content of pasta and rice in his diet.         PAST MEDICAL & SURGICAL HISTORY:  DM (diabetes mellitus)    Atopic dermatitis        FAMILY HISTORY:  Family hx of melanoma (Father)        Social History: No ETOH abuse, No illicit drug use    Outpatient Medications:  · 	simvastatin 10 mg oral tablet: Last Dose Taken:  , 1 tab(s) orally once a day (at bedtime)  · 	metFORMIN 500 mg oral tablet, extended release: Last Dose Taken:  , 1 tab(s) orally 2 times a day  · 	Glyxambi 10 mg-5 mg oral tablet: Last Dose Taken:  , 1 tab(s) orally once a day (in the morning)    MEDICATIONS  (STANDING):  chlorhexidine 4% Liquid 1 Application(s) Topical <User Schedule>  dextrose 5% + lactated ringers with potassium chloride 40 mEq/L 1000 milliLiter(s) (150 mL/Hr) IV Continuous <Continuous>  insulin regular Infusion 5.5 Unit(s)/Hr (5.5 mL/Hr) IV Continuous <Continuous>  piperacillin/tazobactam IVPB.. 3.375 Gram(s) IV Intermittent every 12 hours    MEDICATIONS  (PRN):  HYDROmorphone  Injectable 0.5 milliGRAM(s) IV Push every 4 hours PRN Moderate Pain (4 - 6)  HYDROmorphone  Injectable 1 milliGRAM(s) IV Push every 4 hours PRN Severe Pain (7 - 10)      Allergies    No Known Allergies    Intolerances      Review of Systems:  Constitutional: No fever  Eyes: No blurry vision  Neuro: No tremors  HEENT: +submandibular swelling  Cardiovascular: No chest pain, palpitations  Respiratory: No SOB, no cough  GI: No nausea, vomiting, abdominal pain  : No dysuria  Skin: no rash  Psych: no depression  Endocrine: no polyuria, polydipsia      ALL OTHER SYSTEMS REVIEWED AND NEGATIVE      PHYSICAL EXAM:  VITALS: T(C): 37 (04-13-21 @ 08:00)  T(F): 98.6 (04-13-21 @ 08:00), Max: 98.6 (04-13-21 @ 08:00)  HR: 95 (04-13-21 @ 09:00) (95 - 124)  BP: 123/64 (04-13-21 @ 09:00) (113/86 - 142/65)  RR:  (13 - 19)  SpO2:  (94% - 100%)  Wt(kg): --  GENERAL: NAD, well-groomed, well-developed  EYES: No proptosis, no lid lag, anicteric  HEENT:  Atraumatic, Submandibular swelling  RESPIRATORY: Clear to auscultation bilaterally; No rales, rhonchi, wheezing, or rubs  CARDIOVASCULAR: Regular rate and rhythm  GI: Soft, nontender, non distended, normal bowel sounds  MUSCULOSKELETAL: Full range of motion, normal strength  NEURO: sensation intact, extraocular movements intact, no tremor, normal reflexes  PSYCH: Alert and oriented x 3, normal affect, normal mood      POCT Blood Glucose.: 208 mg/dL (04-13-21 @ 10:04)  POCT Blood Glucose.: 211 mg/dL (04-13-21 @ 09:01)  POCT Blood Glucose.: 222 mg/dL (04-13-21 @ 08:02)  POCT Blood Glucose.: 279 mg/dL (04-13-21 @ 06:32)  POCT Blood Glucose.: 230 mg/dL (04-13-21 @ 05:27)  POCT Blood Glucose.: 237 mg/dL (04-13-21 @ 04:25)  POCT Blood Glucose.: 222 mg/dL (04-13-21 @ 03:14)  POCT Blood Glucose.: 199 mg/dL (04-13-21 @ 02:10)  POCT Blood Glucose.: 169 mg/dL (04-13-21 @ 01:17)  POCT Blood Glucose.: 123 mg/dL (04-13-21 @ 00:08)  POCT Blood Glucose.: 135 mg/dL (04-12-21 @ 23:16)  POCT Blood Glucose.: 150 mg/dL (04-12-21 @ 22:08)  POCT Blood Glucose.: 159 mg/dL (04-12-21 @ 21:16)  POCT Blood Glucose.: 222 mg/dL (04-12-21 @ 19:50)  POCT Blood Glucose.: 261 mg/dL (04-12-21 @ 18:48)  POCT Blood Glucose.: 409 mg/dL (04-12-21 @ 17:42)  POCT Blood Glucose.: 459 mg/dL (04-12-21 @ 16:29)                            16.1   18.84 )-----------( 283      ( 12 Apr 2021 23:45 )             43.7       04-13    132<L>  |  95<L>  |  96<H>  ----------------------------<  254<H>  4.1   |  15<L>  |  3.46<H>    EGFR if : 23<L>  EGFR if non : 20<L>    Ca    8.3<L>      04-13  Mg     2.9     04-13  Phos  6.1     04-13    TPro  8.4<H>  /  Alb  4.3  /  TBili  0.2  /  DBili  x   /  AST  12  /  ALT  15  /  AlkPhos  104  04-12              04-12 Chol 172 LDL -- HDL 36<L> Trig 158<H>

## 2021-04-13 NOTE — DIETITIAN INITIAL EVALUATION ADULT. - OTHER INFO
46 y.o M with PMH of DM (not on insulin at home), CHALINO presents to the hospital after jaw swelling since 4/7 after root canal. On 4/5 patient had root canal performed and developed swelling for which he received clindamycin as well as ibuprofen for pain control. During this time the patient endorses decreasing po intake due to pain, as well as persistent polyuria/polydipsia and nausea ,denies vomiting. In the ED the patient was found to have DKA with a pH of 7.21 as well as an anion gap of 33. The patient was given a IVF bolus as well as started on an insulin drip. The patient was never in the ICU for his diabetes, and denies any prior episodes of DKA. Patient takes metformin 500ER BID at home as well as glyxambi (10-5) daily and has been managed by his PCP Dr. Johnson. The patient was also found to have a developing  phlegmon/abscess in the right platysma and was evaluated by ENT/dental/OMFS. ENT scoped the patient and noted no edema of the vocal cords. Pt. maintained NPO .

## 2021-04-13 NOTE — DIETITIAN INITIAL EVALUATION ADULT. - ADD RECOMMEND
Initiate nutrition support when hemodynamically stable w/ oral supplement if solid foods cannot be initiated .

## 2021-04-13 NOTE — CONSULT NOTE ADULT - PROBLEM SELECTOR RECOMMENDATION 2
Mgmt per primary team
1. recommend dysphagia 2 mechanical soft per speech thin liquids 2. recommend ensure enlive and ensure pudding BID 3. recommend add MVI

## 2021-04-13 NOTE — DIETITIAN INITIAL EVALUATION ADULT. - ORAL INTAKE PTA/DIET HISTORY
Pt. presented alert, oriented , reports decreased PO nutrition for past 1 1/2 months due difficulty chewing , swallowing , & poor appetite . Pt. also voiced wt. loss , UBW _ 250 # and down to 195# . No known food allergies , was on low carb diet , verbalized therapeutic diet fairly well but during recent illness was taking protein shake , soft foods like shakes, puddings , poridges etc

## 2021-04-13 NOTE — CONSULT NOTE ADULT - PROBLEM SELECTOR RECOMMENDATION 9
Continue NPO status and insulin gtt as per DKA protocol until Glucose <200 AG <12 and HCO3 >18.  Please contact endocrine team when patient is ready for transition to basal/bolus  DKA likely precipitated by infection but as SGLT-2 inhibitors can be associated with increased risk of DKA would recommend discontinuation of glyxambi on discharge  If kidney function improves, can consider discharge on metformin ER and semaglutide (oral GLP-1 agonist) (pt prefers oral meds)  Final recs for discharge tbd  Nutrition consult  He can follow up at Endocrine Office [address] [place] [address] [phone]

## 2021-04-13 NOTE — DIETITIAN INITIAL EVALUATION ADULT. - PERTINENT LABORATORY DATA
4/13/2021 Sodium 132 low, BUN 96 high, Cr. 3.46 high, Glu 254 high, Ca 8.3 low, Phos. 6.1 high; 4/12/2021 A1C 8.2% high, triglycerides 158 high

## 2021-04-13 NOTE — PROGRESS NOTE ADULT - ASSESSMENT
A/P  46 y.o M with PMhx of DM presents to the ED with jaw swelling after root canal (4/7) found to have DKA and potential developing phlegmon/abscess in the right platysma gap now closed    Neuro  #pain control  -acetaminophen IV prn  -for severe pain dilaudid prn given TWYLA    Cardiac  -No active issues, hemodynamically stable  -c/w simvastatin when can tolerate po for HLD  -to send lipid panel in AM    Pulm  -patient with submandibular swelling, airway not compromised on scope by ENT  -CXR unremarkable, on room air    Renal (baseline 0.84)  #TWYLA  -likely pre-renal given severe fluid depletion in setting of DKA  -improving with fluid resucitation    GI  -No acute issues  -npo for now given DKA protocol    ID  #right platysma phlegmon/abscess  -with noted leukocytosis +/- component of hemoconcentration due to volume contracted state  -initiated on vanc/zosyn  -BCx pending  -continue broad spectrum abx for now  -appreciate dental and OMFS recs  -c/w drain    Endo  #DKA  -reports last A1c was approximately 6-7  -only on metformin and glyxambi at home  -started in on insulin gtt at 9U/hr, now titrating per DKA protocol  - DKA protocol while in the unit  -Endocrinology c/s, appreciate recs  -fluid resucitation as above    Heme  -No acute issues    Ethics  -Full code    PPx: none Protonix;   FEN: none; replete electrolytes PRN; NPO  Code status: Full      Dispo: c/w care on ICU

## 2021-04-14 LAB
ALBUMIN SERPL ELPH-MCNC: 3.3 G/DL — SIGNIFICANT CHANGE UP (ref 3.3–5)
ALP SERPL-CCNC: 82 U/L — SIGNIFICANT CHANGE UP (ref 40–120)
ALT FLD-CCNC: 10 U/L — SIGNIFICANT CHANGE UP (ref 4–41)
ANION GAP SERPL CALC-SCNC: 11 MMOL/L — SIGNIFICANT CHANGE UP (ref 7–14)
ANION GAP SERPL CALC-SCNC: 8 MMOL/L — SIGNIFICANT CHANGE UP (ref 7–14)
APTT BLD: 44.9 SEC — HIGH (ref 27–36.3)
AST SERPL-CCNC: 15 U/L — SIGNIFICANT CHANGE UP (ref 4–40)
B-OH-BUTYR SERPL-SCNC: <0 MMOL/L — SIGNIFICANT CHANGE UP (ref 0–0.4)
BASOPHILS # BLD AUTO: 0.1 K/UL — SIGNIFICANT CHANGE UP (ref 0–0.2)
BASOPHILS NFR BLD AUTO: 0.8 % — SIGNIFICANT CHANGE UP (ref 0–2)
BILIRUB SERPL-MCNC: 0.4 MG/DL — SIGNIFICANT CHANGE UP (ref 0.2–1.2)
BLOOD GAS VENOUS COMPREHENSIVE RESULT: SIGNIFICANT CHANGE UP
BUN SERPL-MCNC: 63 MG/DL — HIGH (ref 7–23)
BUN SERPL-MCNC: 74 MG/DL — HIGH (ref 7–23)
CALCIUM SERPL-MCNC: 8.6 MG/DL — SIGNIFICANT CHANGE UP (ref 8.4–10.5)
CALCIUM SERPL-MCNC: 8.7 MG/DL — SIGNIFICANT CHANGE UP (ref 8.4–10.5)
CHLORIDE SERPL-SCNC: 108 MMOL/L — HIGH (ref 98–107)
CHLORIDE SERPL-SCNC: 110 MMOL/L — HIGH (ref 98–107)
CHOLEST SERPL-MCNC: 149 MG/DL — SIGNIFICANT CHANGE UP
CK SERPL-CCNC: 16 U/L — LOW (ref 30–200)
CO2 SERPL-SCNC: 20 MMOL/L — LOW (ref 22–31)
CO2 SERPL-SCNC: 21 MMOL/L — LOW (ref 22–31)
CREAT SERPL-MCNC: 1.29 MG/DL — SIGNIFICANT CHANGE UP (ref 0.5–1.3)
CREAT SERPL-MCNC: 1.79 MG/DL — HIGH (ref 0.5–1.3)
CRP SERPL-MCNC: 66.8 MG/L — HIGH
D DIMER BLD IA.RAPID-MCNC: 523 NG/ML DDU — HIGH
EOSINOPHIL # BLD AUTO: 0.3 K/UL — SIGNIFICANT CHANGE UP (ref 0–0.5)
EOSINOPHIL NFR BLD AUTO: 2.4 % — SIGNIFICANT CHANGE UP (ref 0–6)
ERYTHROCYTE [SEDIMENTATION RATE] IN BLOOD: 90 MM/HR — HIGH (ref 1–15)
GLUCOSE BLDC GLUCOMTR-MCNC: 102 MG/DL — HIGH (ref 70–99)
GLUCOSE BLDC GLUCOMTR-MCNC: 109 MG/DL — HIGH (ref 70–99)
GLUCOSE BLDC GLUCOMTR-MCNC: 114 MG/DL — HIGH (ref 70–99)
GLUCOSE BLDC GLUCOMTR-MCNC: 118 MG/DL — HIGH (ref 70–99)
GLUCOSE BLDC GLUCOMTR-MCNC: 118 MG/DL — HIGH (ref 70–99)
GLUCOSE BLDC GLUCOMTR-MCNC: 121 MG/DL — HIGH (ref 70–99)
GLUCOSE BLDC GLUCOMTR-MCNC: 133 MG/DL — HIGH (ref 70–99)
GLUCOSE BLDC GLUCOMTR-MCNC: 140 MG/DL — HIGH (ref 70–99)
GLUCOSE BLDC GLUCOMTR-MCNC: 143 MG/DL — HIGH (ref 70–99)
GLUCOSE BLDC GLUCOMTR-MCNC: 150 MG/DL — HIGH (ref 70–99)
GLUCOSE BLDC GLUCOMTR-MCNC: 152 MG/DL — HIGH (ref 70–99)
GLUCOSE BLDC GLUCOMTR-MCNC: 159 MG/DL — HIGH (ref 70–99)
GLUCOSE BLDC GLUCOMTR-MCNC: 160 MG/DL — HIGH (ref 70–99)
GLUCOSE SERPL-MCNC: 112 MG/DL — HIGH (ref 70–99)
GLUCOSE SERPL-MCNC: 170 MG/DL — HIGH (ref 70–99)
HCT VFR BLD CALC: 40.6 % — SIGNIFICANT CHANGE UP (ref 39–50)
HDLC SERPL-MCNC: 30 MG/DL — LOW
HGB BLD-MCNC: 14.7 G/DL — SIGNIFICANT CHANGE UP (ref 13–17)
IANC: 9.3 K/UL — HIGH (ref 1.5–8.5)
IMM GRANULOCYTES NFR BLD AUTO: 0.5 % — SIGNIFICANT CHANGE UP (ref 0–1.5)
INR BLD: 1.37 RATIO — HIGH (ref 0.88–1.16)
LIPID PNL WITH DIRECT LDL SERPL: 93 MG/DL — SIGNIFICANT CHANGE UP
LYMPHOCYTES # BLD AUTO: 1.08 K/UL — SIGNIFICANT CHANGE UP (ref 1–3.3)
LYMPHOCYTES # BLD AUTO: 8.7 % — LOW (ref 13–44)
MAGNESIUM SERPL-MCNC: 2.8 MG/DL — HIGH (ref 1.6–2.6)
MAGNESIUM SERPL-MCNC: 2.9 MG/DL — HIGH (ref 1.6–2.6)
MCHC RBC-ENTMCNC: 34.7 PG — HIGH (ref 27–34)
MCHC RBC-ENTMCNC: 36.2 GM/DL — HIGH (ref 32–36)
MCV RBC AUTO: 95.8 FL — SIGNIFICANT CHANGE UP (ref 80–100)
MONOCYTES # BLD AUTO: 1.6 K/UL — HIGH (ref 0–0.9)
MONOCYTES NFR BLD AUTO: 12.9 % — SIGNIFICANT CHANGE UP (ref 2–14)
NEUTROPHILS # BLD AUTO: 9.3 K/UL — HIGH (ref 1.8–7.4)
NEUTROPHILS NFR BLD AUTO: 74.7 % — SIGNIFICANT CHANGE UP (ref 43–77)
NON HDL CHOLESTEROL: 119 MG/DL — SIGNIFICANT CHANGE UP
NRBC # BLD: 0 /100 WBCS — SIGNIFICANT CHANGE UP
NRBC # FLD: 0 K/UL — SIGNIFICANT CHANGE UP
PHOSPHATE SERPL-MCNC: 2.6 MG/DL — SIGNIFICANT CHANGE UP (ref 2.5–4.5)
PHOSPHATE SERPL-MCNC: 3.7 MG/DL — SIGNIFICANT CHANGE UP (ref 2.5–4.5)
PLATELET # BLD AUTO: 313 K/UL — SIGNIFICANT CHANGE UP (ref 150–400)
POTASSIUM SERPL-MCNC: 3.8 MMOL/L — SIGNIFICANT CHANGE UP (ref 3.5–5.3)
POTASSIUM SERPL-MCNC: 4 MMOL/L — SIGNIFICANT CHANGE UP (ref 3.5–5.3)
POTASSIUM SERPL-SCNC: 3.8 MMOL/L — SIGNIFICANT CHANGE UP (ref 3.5–5.3)
POTASSIUM SERPL-SCNC: 4 MMOL/L — SIGNIFICANT CHANGE UP (ref 3.5–5.3)
PROCALCITONIN SERPL-MCNC: 0.27 NG/ML — HIGH (ref 0.02–0.1)
PROT SERPL-MCNC: 7.1 G/DL — SIGNIFICANT CHANGE UP (ref 6–8.3)
PROTHROM AB SERPL-ACNC: 15.4 SEC — HIGH (ref 10.6–13.6)
RBC # BLD: 4.24 M/UL — SIGNIFICANT CHANGE UP (ref 4.2–5.8)
RBC # FLD: 11.8 % — SIGNIFICANT CHANGE UP (ref 10.3–14.5)
SODIUM SERPL-SCNC: 139 MMOL/L — SIGNIFICANT CHANGE UP (ref 135–145)
SODIUM SERPL-SCNC: 139 MMOL/L — SIGNIFICANT CHANGE UP (ref 135–145)
TRIGL SERPL-MCNC: 130 MG/DL — SIGNIFICANT CHANGE UP
WBC # BLD: 12.44 K/UL — HIGH (ref 3.8–10.5)
WBC # FLD AUTO: 12.44 K/UL — HIGH (ref 3.8–10.5)

## 2021-04-14 PROCEDURE — 99291 CRITICAL CARE FIRST HOUR: CPT

## 2021-04-14 PROCEDURE — 99232 SBSQ HOSP IP/OBS MODERATE 35: CPT

## 2021-04-14 RX ORDER — INSULIN LISPRO 100/ML
VIAL (ML) SUBCUTANEOUS AT BEDTIME
Refills: 0 | Status: DISCONTINUED | OUTPATIENT
Start: 2021-04-14 | End: 2021-04-16

## 2021-04-14 RX ORDER — DEXTROSE 50 % IN WATER 50 %
25 SYRINGE (ML) INTRAVENOUS ONCE
Refills: 0 | Status: DISCONTINUED | OUTPATIENT
Start: 2021-04-14 | End: 2021-04-16

## 2021-04-14 RX ORDER — DEXTROSE 50 % IN WATER 50 %
12.5 SYRINGE (ML) INTRAVENOUS ONCE
Refills: 0 | Status: DISCONTINUED | OUTPATIENT
Start: 2021-04-14 | End: 2021-04-16

## 2021-04-14 RX ORDER — INSULIN LISPRO 100/ML
VIAL (ML) SUBCUTANEOUS ONCE
Refills: 0 | Status: COMPLETED | OUTPATIENT
Start: 2021-04-14 | End: 2021-04-14

## 2021-04-14 RX ORDER — INSULIN LISPRO 100/ML
8 VIAL (ML) SUBCUTANEOUS
Refills: 0 | Status: DISCONTINUED | OUTPATIENT
Start: 2021-04-14 | End: 2021-04-16

## 2021-04-14 RX ORDER — PIPERACILLIN AND TAZOBACTAM 4; .5 G/20ML; G/20ML
3.38 INJECTION, POWDER, LYOPHILIZED, FOR SOLUTION INTRAVENOUS EVERY 8 HOURS
Refills: 0 | Status: DISCONTINUED | OUTPATIENT
Start: 2021-04-14 | End: 2021-04-16

## 2021-04-14 RX ORDER — DEXTROSE 50 % IN WATER 50 %
15 SYRINGE (ML) INTRAVENOUS ONCE
Refills: 0 | Status: DISCONTINUED | OUTPATIENT
Start: 2021-04-14 | End: 2021-04-16

## 2021-04-14 RX ORDER — INSULIN LISPRO 100/ML
8 VIAL (ML) SUBCUTANEOUS ONCE
Refills: 0 | Status: COMPLETED | OUTPATIENT
Start: 2021-04-14 | End: 2021-04-14

## 2021-04-14 RX ORDER — INSULIN GLARGINE 100 [IU]/ML
20 INJECTION, SOLUTION SUBCUTANEOUS AT BEDTIME
Refills: 0 | Status: DISCONTINUED | OUTPATIENT
Start: 2021-04-15 | End: 2021-04-15

## 2021-04-14 RX ORDER — SODIUM CHLORIDE 9 MG/ML
1000 INJECTION, SOLUTION INTRAVENOUS
Refills: 0 | Status: DISCONTINUED | OUTPATIENT
Start: 2021-04-14 | End: 2021-04-16

## 2021-04-14 RX ORDER — INSULIN LISPRO 100/ML
VIAL (ML) SUBCUTANEOUS
Refills: 0 | Status: DISCONTINUED | OUTPATIENT
Start: 2021-04-14 | End: 2021-04-16

## 2021-04-14 RX ORDER — INSULIN GLARGINE 100 [IU]/ML
20 INJECTION, SOLUTION SUBCUTANEOUS ONCE
Refills: 0 | Status: COMPLETED | OUTPATIENT
Start: 2021-04-14 | End: 2021-04-14

## 2021-04-14 RX ORDER — GLUCAGON INJECTION, SOLUTION 0.5 MG/.1ML
1 INJECTION, SOLUTION SUBCUTANEOUS ONCE
Refills: 0 | Status: DISCONTINUED | OUTPATIENT
Start: 2021-04-14 | End: 2021-04-16

## 2021-04-14 RX ADMIN — HYDROMORPHONE HYDROCHLORIDE 0.5 MILLIGRAM(S): 2 INJECTION INTRAMUSCULAR; INTRAVENOUS; SUBCUTANEOUS at 06:07

## 2021-04-14 RX ADMIN — PIPERACILLIN AND TAZOBACTAM 25 GRAM(S): 4; .5 INJECTION, POWDER, LYOPHILIZED, FOR SOLUTION INTRAVENOUS at 22:37

## 2021-04-14 RX ADMIN — HYDROMORPHONE HYDROCHLORIDE 1 MILLIGRAM(S): 2 INJECTION INTRAMUSCULAR; INTRAVENOUS; SUBCUTANEOUS at 08:20

## 2021-04-14 RX ADMIN — HYDROMORPHONE HYDROCHLORIDE 0.5 MILLIGRAM(S): 2 INJECTION INTRAMUSCULAR; INTRAVENOUS; SUBCUTANEOUS at 20:40

## 2021-04-14 RX ADMIN — PIPERACILLIN AND TAZOBACTAM 25 GRAM(S): 4; .5 INJECTION, POWDER, LYOPHILIZED, FOR SOLUTION INTRAVENOUS at 13:21

## 2021-04-14 RX ADMIN — CHLORHEXIDINE GLUCONATE 1 APPLICATION(S): 213 SOLUTION TOPICAL at 06:07

## 2021-04-14 RX ADMIN — INSULIN HUMAN 5.5 UNIT(S)/HR: 100 INJECTION, SOLUTION SUBCUTANEOUS at 04:32

## 2021-04-14 RX ADMIN — INSULIN GLARGINE 20 UNIT(S): 100 INJECTION, SOLUTION SUBCUTANEOUS at 12:10

## 2021-04-14 RX ADMIN — Medication 8 UNIT(S): at 14:20

## 2021-04-14 RX ADMIN — Medication 8 UNIT(S): at 17:30

## 2021-04-14 RX ADMIN — HYDROMORPHONE HYDROCHLORIDE 1 MILLIGRAM(S): 2 INJECTION INTRAMUSCULAR; INTRAVENOUS; SUBCUTANEOUS at 16:33

## 2021-04-14 RX ADMIN — HEPARIN SODIUM 5000 UNIT(S): 5000 INJECTION INTRAVENOUS; SUBCUTANEOUS at 22:36

## 2021-04-14 RX ADMIN — HYDROMORPHONE HYDROCHLORIDE 1 MILLIGRAM(S): 2 INJECTION INTRAMUSCULAR; INTRAVENOUS; SUBCUTANEOUS at 01:30

## 2021-04-14 RX ADMIN — DEXTROSE MONOHYDRATE, SODIUM CHLORIDE, AND POTASSIUM CHLORIDE 200 MILLILITER(S): 50; .745; 4.5 INJECTION, SOLUTION INTRAVENOUS at 07:51

## 2021-04-14 RX ADMIN — HYDROMORPHONE HYDROCHLORIDE 1 MILLIGRAM(S): 2 INJECTION INTRAMUSCULAR; INTRAVENOUS; SUBCUTANEOUS at 22:37

## 2021-04-14 RX ADMIN — HYDROMORPHONE HYDROCHLORIDE 0.5 MILLIGRAM(S): 2 INJECTION INTRAMUSCULAR; INTRAVENOUS; SUBCUTANEOUS at 04:32

## 2021-04-14 RX ADMIN — HYDROMORPHONE HYDROCHLORIDE 1 MILLIGRAM(S): 2 INJECTION INTRAMUSCULAR; INTRAVENOUS; SUBCUTANEOUS at 08:01

## 2021-04-14 RX ADMIN — HYDROMORPHONE HYDROCHLORIDE 0.5 MILLIGRAM(S): 2 INJECTION INTRAMUSCULAR; INTRAVENOUS; SUBCUTANEOUS at 20:24

## 2021-04-14 RX ADMIN — HYDROMORPHONE HYDROCHLORIDE 1 MILLIGRAM(S): 2 INJECTION INTRAMUSCULAR; INTRAVENOUS; SUBCUTANEOUS at 02:00

## 2021-04-14 RX ADMIN — INSULIN HUMAN 5.5 UNIT(S)/HR: 100 INJECTION, SOLUTION SUBCUTANEOUS at 07:51

## 2021-04-14 RX ADMIN — HYDROMORPHONE HYDROCHLORIDE 0.5 MILLIGRAM(S): 2 INJECTION INTRAMUSCULAR; INTRAVENOUS; SUBCUTANEOUS at 12:12

## 2021-04-14 RX ADMIN — PIPERACILLIN AND TAZOBACTAM 25 GRAM(S): 4; .5 INJECTION, POWDER, LYOPHILIZED, FOR SOLUTION INTRAVENOUS at 05:44

## 2021-04-14 RX ADMIN — HYDROMORPHONE HYDROCHLORIDE 1 MILLIGRAM(S): 2 INJECTION INTRAMUSCULAR; INTRAVENOUS; SUBCUTANEOUS at 22:55

## 2021-04-14 RX ADMIN — HEPARIN SODIUM 5000 UNIT(S): 5000 INJECTION INTRAVENOUS; SUBCUTANEOUS at 13:21

## 2021-04-14 RX ADMIN — HYDROMORPHONE HYDROCHLORIDE 0.5 MILLIGRAM(S): 2 INJECTION INTRAMUSCULAR; INTRAVENOUS; SUBCUTANEOUS at 11:54

## 2021-04-14 RX ADMIN — HYDROMORPHONE HYDROCHLORIDE 1 MILLIGRAM(S): 2 INJECTION INTRAMUSCULAR; INTRAVENOUS; SUBCUTANEOUS at 16:19

## 2021-04-14 NOTE — PROGRESS NOTE ADULT - SUBJECTIVE AND OBJECTIVE BOX
PATIENT:  RAUL ALLEN  [id number]    CHIEF COMPLAINT:  Patient is a 46y old  Male who presents with a chief complaint of Facial Abscess and DKA (14 Apr 2021 08:14)      INTERVAL HISTORYOVERNIGHT EVENTS:  BASSEM overnight. Gap now closed     REVIEW OF SYSTEMS:    MEDICATIONS:  MEDICATIONS  (STANDING):  chlorhexidine 4% Liquid 1 Application(s) Topical <User Schedule>  dextrose 40% Gel 15 Gram(s) Oral once  dextrose 5% + lactated ringers with potassium chloride 40 mEq/L 1000 milliLiter(s) (200 mL/Hr) IV Continuous <Continuous>  dextrose 5%. 1000 milliLiter(s) (50 mL/Hr) IV Continuous <Continuous>  dextrose 5%. 1000 milliLiter(s) (100 mL/Hr) IV Continuous <Continuous>  dextrose 50% Injectable 25 Gram(s) IV Push once  dextrose 50% Injectable 12.5 Gram(s) IV Push once  dextrose 50% Injectable 25 Gram(s) IV Push once  glucagon  Injectable 1 milliGRAM(s) IntraMuscular once  heparin   Injectable 5000 Unit(s) SubCutaneous every 8 hours  insulin lispro (ADMELOG) corrective regimen sliding scale   SubCutaneous three times a day before meals  insulin lispro (ADMELOG) corrective regimen sliding scale   SubCutaneous at bedtime  insulin lispro Injectable (ADMELOG) 8 Unit(s) SubCutaneous three times a day before meals  insulin regular Infusion 5 Unit(s)/Hr (5 mL/Hr) IV Continuous <Continuous>  piperacillin/tazobactam IVPB.. 3.375 Gram(s) IV Intermittent every 8 hours    MEDICATIONS  (PRN):  HYDROmorphone  Injectable 0.5 milliGRAM(s) IV Push every 4 hours PRN Moderate Pain (4 - 6)  HYDROmorphone  Injectable 1 milliGRAM(s) IV Push every 4 hours PRN Severe Pain (7 - 10)      ALLERGIES:  Allergies    No Known Allergies    Intolerances      OBJECTIVE:  ICU Vital Signs Last 24 Hrs  T(C): 35.8 (14 Apr 2021 12:00), Max: 36.9 (13 Apr 2021 19:00)  T(F): 96.4 (14 Apr 2021 12:00), Max: 98.4 (13 Apr 2021 19:00)  HR: 74 (14 Apr 2021 12:00) (74 - 106)  BP: 128/78 (14 Apr 2021 12:00) (111/80 - 140/91)  BP(mean): 94 (14 Apr 2021 12:00) (86 - 106)  RR: 15 (14 Apr 2021 12:00) (10 - 21)  SpO2: 100% (14 Apr 2021 12:00) (91% - 100%)      POCT Blood Glucose.: 150 mg/dL (14 Apr 2021 11:46)  POCT Blood Glucose.: 160 mg/dL (14 Apr 2021 10:45)  POCT Blood Glucose.: 159 mg/dL (14 Apr 2021 09:30)  POCT Blood Glucose.: 152 mg/dL (14 Apr 2021 08:32)  POCT Blood Glucose.: 143 mg/dL (14 Apr 2021 07:22)  POCT Blood Glucose.: 140 mg/dL (14 Apr 2021 05:48)  POCT Blood Glucose.: 109 mg/dL (14 Apr 2021 04:24)  POCT Blood Glucose.: 114 mg/dL (14 Apr 2021 02:13)  POCT Blood Glucose.: 118 mg/dL (14 Apr 2021 00:26)  POCT Blood Glucose.: 131 mg/dL (13 Apr 2021 22:49)  POCT Blood Glucose.: 136 mg/dL (13 Apr 2021 21:01)  POCT Blood Glucose.: 134 mg/dL (13 Apr 2021 19:54)  POCT Blood Glucose.: 123 mg/dL (13 Apr 2021 18:15)  POCT Blood Glucose.: 104 mg/dL (13 Apr 2021 17:23)  POCT Blood Glucose.: 86 mg/dL (13 Apr 2021 17:19)  POCT Blood Glucose.: 154 mg/dL (13 Apr 2021 16:05)  POCT Blood Glucose.: 140 mg/dL (13 Apr 2021 15:03)  POCT Blood Glucose.: 169 mg/dL (13 Apr 2021 13:52)    CAPILLARY BLOOD GLUCOSE      POCT Blood Glucose.: 150 mg/dL (14 Apr 2021 11:46)    I&O's Summary    13 Apr 2021 07:01  -  14 Apr 2021 07:00  --------------------------------------------------------  IN: 4379 mL / OUT: 2825 mL / NET: 1554 mL    14 Apr 2021 07:01  -  14 Apr 2021 12:52  --------------------------------------------------------  IN: 1000 mL / OUT: 800 mL / NET: 200 mL      Daily     Daily     PHYSICAL EXAMINATION:  General: WN/WD NAD  HEENT: PERRLA, EOMI, moist mucous membranes  Neurology: A&Ox3, nonfocal, CHAPMAN x 4  Respiratory: CTA B/L, normal respiratory effort, no wheezes, crackles, rales  CV: RRR, S1S2, no murmurs, rubs or gallops  Abdominal: Soft, NT, ND +BS, Last BM  Extremities: No edema, + peripheral pulses  Incisions:   Tubes:    LABS:                          14.7   12.44 )-----------( 313      ( 14 Apr 2021 02:55 )             40.6     04-14    139  |  110<H>  |  63<H>  ----------------------------<  170<H>  4.0   |  21<L>  |  1.29    Ca    8.7      14 Apr 2021 09:31  Phos  2.6     04-14  Mg     2.8     04-14    TPro  7.1  /  Alb  3.3  /  TBili  0.4  /  DBili  x   /  AST  15  /  ALT  10  /  AlkPhos  82  04-14    LIVER FUNCTIONS - ( 14 Apr 2021 02:55 )  Alb: 3.3 g/dL / Pro: 7.1 g/dL / ALK PHOS: 82 U/L / ALT: 10 U/L / AST: 15 U/L / GGT: x           PT/INR - ( 14 Apr 2021 02:55 )   PT: 15.4 sec;   INR: 1.37 ratio         PTT - ( 14 Apr 2021 02:55 )  PTT:44.9 sec  Creatine Kinase, Serum: 16 U/L (04-14 @ 02:55)    CARDIAC MARKERS ( 14 Apr 2021 02:55 )  x     / x     / 16 U/L / x     / x              TELEMETRY:     EKG:     IMAGING:

## 2021-04-14 NOTE — PROGRESS NOTE ADULT - SUBJECTIVE AND OBJECTIVE BOX
Contact info:   Huber Hartman MD  pager [phone], please provide 10 digit call back number.   Please note that this patient may be followed by another provider tomorrow.   If no answer or after hours, please contact [phone]    Interval History/Subjective:  Patient feels well.  States that he is hungry.  Is willing to consider insulin for a short while after discharge to achieve glycemic goal.      MEDICATIONS  (STANDING):  chlorhexidine 4% Liquid 1 Application(s) Topical <User Schedule>  dextrose 40% Gel 15 Gram(s) Oral once  dextrose 5% + lactated ringers with potassium chloride 40 mEq/L 1000 milliLiter(s) (200 mL/Hr) IV Continuous <Continuous>  dextrose 5%. 1000 milliLiter(s) (50 mL/Hr) IV Continuous <Continuous>  dextrose 5%. 1000 milliLiter(s) (100 mL/Hr) IV Continuous <Continuous>  dextrose 50% Injectable 25 Gram(s) IV Push once  dextrose 50% Injectable 12.5 Gram(s) IV Push once  dextrose 50% Injectable 25 Gram(s) IV Push once  glucagon  Injectable 1 milliGRAM(s) IntraMuscular once  heparin   Injectable 5000 Unit(s) SubCutaneous every 8 hours  insulin lispro (ADMELOG) corrective regimen sliding scale   SubCutaneous three times a day before meals  insulin lispro (ADMELOG) corrective regimen sliding scale   SubCutaneous at bedtime  insulin lispro Injectable (ADMELOG) 8 Unit(s) SubCutaneous three times a day before meals  insulin regular Infusion 5 Unit(s)/Hr (5 mL/Hr) IV Continuous <Continuous>  piperacillin/tazobactam IVPB.. 3.375 Gram(s) IV Intermittent every 8 hours    MEDICATIONS  (PRN):  HYDROmorphone  Injectable 0.5 milliGRAM(s) IV Push every 4 hours PRN Moderate Pain (4 - 6)  HYDROmorphone  Injectable 1 milliGRAM(s) IV Push every 4 hours PRN Severe Pain (7 - 10)      Allergies    No Known Allergies    Intolerances      Review of Systems:  Constitutional: No fever  Eyes: No blurry vision  Neuro: No tremors  HEENT: No pain  Cardiovascular: No chest pain, palpitations  Respiratory: No SOB, no cough  GI: No nausea, vomiting, abdominal pain  : No dysuria  Skin: no rash  Psych: no depression  Endocrine: no polyuria, polydipsia  Hem/lymph: no swelling    ALL OTHER SYSTEMS REVIEWED AND NEGATIVE    PHYSICAL EXAM:  VITALS: T(C): 35.8 (04-14-21 @ 12:00)  T(F): 96.4 (04-14-21 @ 12:00), Max: 98.4 (04-13-21 @ 19:00)  HR: 74 (04-14-21 @ 12:00) (74 - 106)  BP: 128/78 (04-14-21 @ 12:00) (111/80 - 140/91)  RR:  (10 - 21)  SpO2:  (91% - 100%)  Wt(kg): --  GENERAL: NAD  EYES: No proptosis, no lid lag, anicteric  HEENT:  Atraumatic, Normocephalic, moist mucous membranes  RESPIRATORY: nonlabored respirations  PSYCH: Alert and oriented x 3, normal affect, normal mood    POCT Blood Glucose.: 150 mg/dL (04-14-21 @ 11:46)  POCT Blood Glucose.: 160 mg/dL (04-14-21 @ 10:45)  POCT Blood Glucose.: 159 mg/dL (04-14-21 @ 09:30)  POCT Blood Glucose.: 152 mg/dL (04-14-21 @ 08:32)  POCT Blood Glucose.: 143 mg/dL (04-14-21 @ 07:22)  POCT Blood Glucose.: 140 mg/dL (04-14-21 @ 05:48)  POCT Blood Glucose.: 109 mg/dL (04-14-21 @ 04:24)  POCT Blood Glucose.: 114 mg/dL (04-14-21 @ 02:13)  POCT Blood Glucose.: 118 mg/dL (04-14-21 @ 00:26)  POCT Blood Glucose.: 131 mg/dL (04-13-21 @ 22:49)  POCT Blood Glucose.: 136 mg/dL (04-13-21 @ 21:01)  POCT Blood Glucose.: 134 mg/dL (04-13-21 @ 19:54)  POCT Blood Glucose.: 123 mg/dL (04-13-21 @ 18:15)  POCT Blood Glucose.: 104 mg/dL (04-13-21 @ 17:23)  POCT Blood Glucose.: 86 mg/dL (04-13-21 @ 17:19)  POCT Blood Glucose.: 154 mg/dL (04-13-21 @ 16:05)  POCT Blood Glucose.: 140 mg/dL (04-13-21 @ 15:03)  POCT Blood Glucose.: 169 mg/dL (04-13-21 @ 13:52)  POCT Blood Glucose.: 176 mg/dL (04-13-21 @ 12:06)  POCT Blood Glucose.: 225 mg/dL (04-13-21 @ 10:58)  POCT Blood Glucose.: 208 mg/dL (04-13-21 @ 10:04)  POCT Blood Glucose.: 211 mg/dL (04-13-21 @ 09:01)  POCT Blood Glucose.: 222 mg/dL (04-13-21 @ 08:02)  POCT Blood Glucose.: 279 mg/dL (04-13-21 @ 06:32)  POCT Blood Glucose.: 230 mg/dL (04-13-21 @ 05:27)  POCT Blood Glucose.: 237 mg/dL (04-13-21 @ 04:25)  POCT Blood Glucose.: 222 mg/dL (04-13-21 @ 03:14)  POCT Blood Glucose.: 199 mg/dL (04-13-21 @ 02:10)  POCT Blood Glucose.: 169 mg/dL (04-13-21 @ 01:17)  POCT Blood Glucose.: 123 mg/dL (04-13-21 @ 00:08)  POCT Blood Glucose.: 135 mg/dL (04-12-21 @ 23:16)  POCT Blood Glucose.: 150 mg/dL (04-12-21 @ 22:08)  POCT Blood Glucose.: 159 mg/dL (04-12-21 @ 21:16)  POCT Blood Glucose.: 222 mg/dL (04-12-21 @ 19:50)  POCT Blood Glucose.: 261 mg/dL (04-12-21 @ 18:48)  POCT Blood Glucose.: 409 mg/dL (04-12-21 @ 17:42)  POCT Blood Glucose.: 459 mg/dL (04-12-21 @ 16:29)      04-14    139  |  110<H>  |  63<H>  ----------------------------<  170<H>  4.0   |  21<L>  |  1.29    EGFR if : 76  EGFR if non : 66    Ca    8.7      04-14  Mg     2.8     04-14  Phos  2.6     04-14    TPro  7.1  /  Alb  3.3  /  TBili  0.4  /  DBili  x   /  AST  15  /  ALT  10  /  AlkPhos  82  04-14    Thyroid Function Tests:

## 2021-04-14 NOTE — PROGRESS NOTE ADULT - ASSESSMENT
46 year old male with DKA s/p exo and I&D #29 with Penrose drain placement on 4/12/2021 - progressing well    - care as per MICU  - drain removal once purluent drainage subsides

## 2021-04-14 NOTE — PROGRESS NOTE ADULT - SUBJECTIVE AND OBJECTIVE BOX
Patient is a 46y Male with PMH significant for DM, had root canal on tooth #29 on 4/7, now p/w with worsening swelling below jaw. red, warm. has odynophagia but no dysphagia. no sob. no fevers or chills. has been on clinda x5d. Patient is s/p exo #29 in dental clinic on 04/12/2021.    PAST MEDICAL & SURGICAL HISTORY:  DM (diabetes mellitus)  Atopic dermatitis    MEDICATIONS  (STANDING):  chlorhexidine 4% Liquid 1 Application(s) Topical <User Schedule>  dextrose 5% + lactated ringers with potassium chloride 40 mEq/L 1000 milliLiter(s) (200 mL/Hr) IV Continuous <Continuous>  heparin   Injectable 5000 Unit(s) SubCutaneous every 8 hours  insulin regular Infusion 5.5 Unit(s)/Hr (5.5 mL/Hr) IV Continuous <Continuous>  piperacillin/tazobactam IVPB.. 3.375 Gram(s) IV Intermittent every 12 hours    MEDICATIONS  (PRN):  HYDROmorphone  Injectable 0.5 milliGRAM(s) IV Push every 4 hours PRN Moderate Pain (4 - 6)  HYDROmorphone  Injectable 1 milliGRAM(s) IV Push every 4 hours PRN Severe Pain (7 - 10)    Allergies No Known Allergies      Vital Signs Last 24 Hrs  T(C): 36.7 (13 Apr 2021 23:00), Max: 36.9 (13 Apr 2021 12:00)  T(F): 98 (13 Apr 2021 23:00), Max: 98.5 (13 Apr 2021 12:00)  HR: 76 (14 Apr 2021 07:00) (74 - 106)  BP: 135/72 (14 Apr 2021 07:00) (111/80 - 136/80)  BP(mean): 87 (14 Apr 2021 07:00) (81 - 96)  RR: 14 (14 Apr 2021 07:00) (10 - 21)  SpO2: 96% (14 Apr 2021 07:00) (91% - 99%)      LABS:              14.7   12.44 )-----------( 313      ( 14 Apr 2021 02:55 )             40.6     04-14    139  |  108<H>  |  74<H>  ----------------------------<  112<H>  3.8   |  20<L>  |  1.79<H>    Ca    8.6      14 Apr 2021 02:55  Phos  3.7     04-14  Mg     2.9     04-14    TPro  7.1  /  Alb  3.3  /  TBili  0.4  /  DBili  x   /  AST  15  /  ALT  10  /  AlkPhos  82  04-14  PT/INR - ( 14 Apr 2021 02:55 )   PT: 15.4 sec;   INR: 1.37 ratio    PTT - ( 14 Apr 2021 02:55 )  PTT:44.9 sec  LIVER FUNCTIONS - ( 14 Apr 2021 02:55 )  Alb: 3.3 g/dL / Pro: 7.1 g/dL / ALK PHOS: 82 U/L / ALT: 10 U/L / AST: 15 U/L / GGT: x             13 Apr 2021 07:01  -  14 Apr 2021 07:00  --------------------------------------------------------  IN:    dextrose 5% + lactated ringers w/ Additives: 4250 mL    Insulin: 129 mL  Total IN: 4379 mL  OUT:    Voided (mL): 2825 mL  Total OUT: 2825 mL  Total NET: 1554 mL      I&O's Summary    13 Apr 2021 07:01  -  14 Apr 2021 07:00  --------------------------------------------------------  IN: 4379 mL / OUT: 2825 mL / NET: 1554 mL        CARDIAC MARKERS ( 14 Apr 2021 02:55 )  x     / x     / 16 U/L / x     / x         /     CAPILLARY BLOOD GLUCOSE  POCT Blood Glucose.: 143 mg/dL (14 Apr 2021 07:22)  POCT Blood Glucose.: 140 mg/dL (14 Apr 2021 05:48)  POCT Blood Glucose.: 109 mg/dL (14 Apr 2021 04:24)  POCT Blood Glucose.: 114 mg/dL (14 Apr 2021 02:13)  POCT Blood Glucose.: 118 mg/dL (14 Apr 2021 00:26)  POCT Blood Glucose.: 131 mg/dL (13 Apr 2021 22:49)  POCT Blood Glucose.: 136 mg/dL (13 Apr 2021 21:01)  POCT Blood Glucose.: 134 mg/dL (13 Apr 2021 19:54)  POCT Blood Glucose.: 123 mg/dL (13 Apr 2021 18:15)  POCT Blood Glucose.: 104 mg/dL (13 Apr 2021 17:23)  POCT Blood Glucose.: 86 mg/dL (13 Apr 2021 17:19)  POCT Blood Glucose.: 154 mg/dL (13 Apr 2021 16:05)  POCT Blood Glucose.: 140 mg/dL (13 Apr 2021 15:03)  POCT Blood Glucose.: 169 mg/dL (13 Apr 2021 13:52)  POCT Blood Glucose.: 176 mg/dL (13 Apr 2021 12:06)  POCT Blood Glucose.: 225 mg/dL (13 Apr 2021 10:58)  POCT Blood Glucose.: 208 mg/dL (13 Apr 2021 10:04)  POCT Blood Glucose.: 211 mg/dL (13 Apr 2021 09:01)        PE  Gen; AAOx3, NC/ND, atraumatic  EOE: Extra oral submental cellulitic swelling noted that is tender to touch.  IOE:  Penrose drain x1 in extration site #29. scant purluent drainage. site irrigated with sterile saline.

## 2021-04-14 NOTE — CHART NOTE - NSCHARTNOTEFT_GEN_A_CORE
PATIENT:  RAUL ALLEN  [id number]    CHIEF COMPLAINT:  Patient is a 46y old  Male who presents with a chief complaint of Facial Abscess and DKA (14 Apr 2021 08:14)    INTERVAL HISTORYOVERNIGHT EVENTS:    BASSEM overnight. Gap now closed     REVIEW OF SYSTEMS:    MEDICATIONS:  MEDICATIONS  (STANDING):  chlorhexidine 4% Liquid 1 Application(s) Topical <User Schedule>  dextrose 40% Gel 15 Gram(s) Oral once  dextrose 5% + lactated ringers with potassium chloride 40 mEq/L 1000 milliLiter(s) (200 mL/Hr) IV Continuous <Continuous>  dextrose 5%. 1000 milliLiter(s) (50 mL/Hr) IV Continuous <Continuous>  dextrose 5%. 1000 milliLiter(s) (100 mL/Hr) IV Continuous <Continuous>  dextrose 50% Injectable 25 Gram(s) IV Push once  dextrose 50% Injectable 12.5 Gram(s) IV Push once  dextrose 50% Injectable 25 Gram(s) IV Push once  glucagon  Injectable 1 milliGRAM(s) IntraMuscular once  heparin   Injectable 5000 Unit(s) SubCutaneous every 8 hours  insulin lispro (ADMELOG) corrective regimen sliding scale   SubCutaneous three times a day before meals  insulin lispro (ADMELOG) corrective regimen sliding scale   SubCutaneous at bedtime  insulin lispro Injectable (ADMELOG) 8 Unit(s) SubCutaneous three times a day before meals  insulin regular Infusion 5 Unit(s)/Hr (5 mL/Hr) IV Continuous <Continuous>  piperacillin/tazobactam IVPB.. 3.375 Gram(s) IV Intermittent every 8 hours    MEDICATIONS  (PRN):  HYDROmorphone  Injectable 0.5 milliGRAM(s) IV Push every 4 hours PRN Moderate Pain (4 - 6)  HYDROmorphone  Injectable 1 milliGRAM(s) IV Push every 4 hours PRN Severe Pain (7 - 10)      ALLERGIES:  Allergies    No Known Allergies    Intolerances      OBJECTIVE:  ICU Vital Signs Last 24 Hrs  T(C): 35.8 (14 Apr 2021 12:00), Max: 36.9 (13 Apr 2021 19:00)  T(F): 96.4 (14 Apr 2021 12:00), Max: 98.4 (13 Apr 2021 19:00)  HR: 74 (14 Apr 2021 12:00) (74 - 106)  BP: 128/78 (14 Apr 2021 12:00) (111/80 - 140/91)  BP(mean): 94 (14 Apr 2021 12:00) (86 - 106)  RR: 15 (14 Apr 2021 12:00) (10 - 21)  SpO2: 100% (14 Apr 2021 12:00) (91% - 100%)      POCT Blood Glucose.: 150 mg/dL (14 Apr 2021 11:46)  POCT Blood Glucose.: 160 mg/dL (14 Apr 2021 10:45)  POCT Blood Glucose.: 159 mg/dL (14 Apr 2021 09:30)  POCT Blood Glucose.: 152 mg/dL (14 Apr 2021 08:32)  POCT Blood Glucose.: 143 mg/dL (14 Apr 2021 07:22)  POCT Blood Glucose.: 140 mg/dL (14 Apr 2021 05:48)  POCT Blood Glucose.: 109 mg/dL (14 Apr 2021 04:24)  POCT Blood Glucose.: 114 mg/dL (14 Apr 2021 02:13)  POCT Blood Glucose.: 118 mg/dL (14 Apr 2021 00:26)  POCT Blood Glucose.: 131 mg/dL (13 Apr 2021 22:49)  POCT Blood Glucose.: 136 mg/dL (13 Apr 2021 21:01)  POCT Blood Glucose.: 134 mg/dL (13 Apr 2021 19:54)  POCT Blood Glucose.: 123 mg/dL (13 Apr 2021 18:15)  POCT Blood Glucose.: 104 mg/dL (13 Apr 2021 17:23)  POCT Blood Glucose.: 86 mg/dL (13 Apr 2021 17:19)  POCT Blood Glucose.: 154 mg/dL (13 Apr 2021 16:05)  POCT Blood Glucose.: 140 mg/dL (13 Apr 2021 15:03)  POCT Blood Glucose.: 169 mg/dL (13 Apr 2021 13:52)    CAPILLARY BLOOD GLUCOSE      POCT Blood Glucose.: 150 mg/dL (14 Apr 2021 11:46)    I&O's Summary    13 Apr 2021 07:01  -  14 Apr 2021 07:00  --------------------------------------------------------  IN: 4379 mL / OUT: 2825 mL / NET: 1554 mL    14 Apr 2021 07:01  -  14 Apr 2021 12:52  --------------------------------------------------------  IN: 1000 mL / OUT: 800 mL / NET: 200 mL      Daily     Daily     PHYSICAL EXAMINATION:  General: WN/WD NAD  HEENT: PERRLA, EOMI, moist mucous membranes  Neurology: A&Ox3, nonfocal, CHAPMAN x 4  Respiratory: CTA B/L, normal respiratory effort, no wheezes, crackles, rales  CV: RRR, S1S2, no murmurs, rubs or gallops  Abdominal: Soft, NT, ND +BS, Last BM  Extremities: No edema, + peripheral pulses  Incisions:   Tubes:    LABS:                          14.7   12.44 )-----------( 313      ( 14 Apr 2021 02:55 )             40.6     04-14    139  |  110<H>  |  63<H>  ----------------------------<  170<H>  4.0   |  21<L>  |  1.29    Ca    8.7      14 Apr 2021 09:31  Phos  2.6     04-14  Mg     2.8     04-14    TPro  7.1  /  Alb  3.3  /  TBili  0.4  /  DBili  x   /  AST  15  /  ALT  10  /  AlkPhos  82  04-14    LIVER FUNCTIONS - ( 14 Apr 2021 02:55 )  Alb: 3.3 g/dL / Pro: 7.1 g/dL / ALK PHOS: 82 U/L / ALT: 10 U/L / AST: 15 U/L / GGT: x           PT/INR - ( 14 Apr 2021 02:55 )   PT: 15.4 sec;   INR: 1.37 ratio         PTT - ( 14 Apr 2021 02:55 )  PTT:44.9 sec  Creatine Kinase, Serum: 16 U/L (04-14 @ 02:55)    CARDIAC MARKERS ( 14 Apr 2021 02:55 )  x     / x     / 16 U/L / x     / x              TELEMETRY:     EKG:     IMAGING:      Assessment and Plan:   Assessment	  A/P  46 y.o M with PMhx of DM presents to the ED with jaw swelling after root canal (4/7) found to have DKA and potential developing phlegmon/abscess in the right platysma gap now closed    Neuro  #pain control  -acetaminophen IV prn  -for severe pain dilaudid prn given TWYLA    Cardiac  -No active issues, hemodynamically stable  -c/w simvastatin when can tolerate po for HLD    Pulm  -patient with submandibular swelling, airway not compromised on scope by ENT  -CXR unremarkable, on room air    Renal (baseline 0.84)  #TWYLA  -likely pre-renal given severe fluid depletion in setting of DKA  -greatly improved after fluid resuscitation    GI  -No acute issues  -was npo for now given DKA protocol, diet initiated    ID  #right platysma phlegmon/abscess  -with noted leukocytosis +/- component of hemoconcentration due to volume contracted state  -initiated on vanc/zosyn, c/w zosyn for now  -BCx with NGTD  -appreciate dental and OMFS recs  -c/w drain    Endo  #DKA  -reports last A1c was approximately 6-7  -only on metformin and glyxambi at home  -started in on insulin gtt at 9U/hr, now titrating off  -initiating basal-bolus overlap  -will initiate DKA protocol while in the unit  -Endocrinology c/s, will eval on 4/13  -fluid resuscitation as above    Heme  -No acute issues    Ethics  -Full code    PPx: none Protonix;   FEN: none; replete electrolytes PRN; NPO  Code status: Full      Dispo: stable for transfer to floor    For Follow Up    [ ]f/u OMFS recs for drainage removal timing  [ ]c/w zosyn for now. follow up abscess and blood cultures  [ ]f/u endocrine recs. c/w basal 28 and bolus 8 units with low SSI correction

## 2021-04-14 NOTE — PROGRESS NOTE ADULT - ASSESSMENT
A/P  46 y.o M with PMhx of DM presents to the ED with jaw swelling after root canal (4/7) found to have DKA and potential developing phlegmon/abscess in the right platysma gap now closed    Neuro  #pain control  -acetaminophen IV prn  -for severe pain dilaudid prn given TWYLA    Cardiac  -No active issues, hemodynamically stable  -c/w simvastatin when can tolerate po for HLD  -to send lipid panel in AM    Pulm  -patient with submandibular swelling, airway not compromised on scope by ENT  -CXR unremarkable, on room air    Renal (baseline 0.84)  #TWYLA  -likely pre-renal given severe fluid depletion in setting of DKA  -greatly improved after fluid resucitation    GI  -No acute issues  -was npo for now given DKA protocol, now restarting diet    ID  #right platysma phlegmon/abscess  -with noted leukocytosis +/- component of hemoconcentration due to volume contracted state  -initiated on vanc/zosyn  -BCx pending  -continue broad spectrum abx for now  -appreciate dental and OMFS recs  -c/w drain    Endo  #DKA  -reports last A1c was approximately 6-7  -only on metformin and glyxambi at home  -started in on insulin gtt at 9U/hr, now titrating off  -initiating basal-bolus overlap  -will initiate DKA protocol while in the unit  -Endocrinology c/s, will eval on 4/13  -fluid resucitation as above    Heme  -No acute issues    Ethics  -Full code    PPx: none Protonix;   FEN: none; replete electrolytes PRN; NPO  Code status: Full      Dispo: c/w care on ICU

## 2021-04-14 NOTE — PROGRESS NOTE ADULT - ASSESSMENT
46 yr old M with Type 2 DM uncontrolled A1C 8.2 here with submandibular abscess and found to be in DKA.      1. Type 2 diabetes mellitus with ketoacidosis without coma, without long-term current use of insulin.   DKA resolved. Patient states he is eager to start diet.   Patient required about insulin regular 5 units/hour, on dextrose 200 cc/hr  Recommend to start Lantus 20 units daily, given first does, then dc gtt 2 hours afterwards.   Recommend to start Admelog 8 units tid with meals  Recommend LDSS qac/hs     DKA likely precipitated by infection but as SGLT-2 inhibitors can be associated with increased risk of DKA would recommend discontinuation of glyxambi on discharge  If kidney function improves, can consider discharge on metformin ER and semaglutide (oral GLP-1 agonist) (pt prefers oral meds)  Final recs for discharge tbd, I did bring up the idea of possible basal bolus as he had high insulin requirement and may be benefical for wound healing int he setting of his abscess.   Will start insulin pen teaching.  (see provider to RN)   Nutrition consult  He can follow up at [facility]ocrine Office [address] [place] [address] [place] [phone].    2.  Hypertension, unspecified type.    Mgmt per primary team.     3.  Hyperlipidemia, unspecified hyperlipidemia type.   Recommend moderate intensity statin LDL goal <100 46 yr old M with Type 2 DM uncontrolled A1C 8.2 here with submandibular abscess and found to be in DKA.      1. Type 2 diabetes mellitus with ketoacidosis without coma, without long-term current use of insulin.   DKA resolved. Patient states he is eager to start diet.   Patient required about insulin regular 5 units/hour, on dextrose 200 cc/hr  Recommend to start Lantus 20 units daily, given first does, then dc gtt 2 hours afterwards.   Recommend to start Admelog 8 units tid with meals  Recommend LDSS qac/hs     DKA likely precipitated by infection but as SGLT-2 inhibitors can be associated with increased risk of DKA would recommend discontinuation of glyxambi on discharge  If kidney function improves, can consider discharge on metformin ER and semaglutide (oral GLP-1 agonist) (pt prefers oral meds)  Final recs for discharge tbd, I did bring up the idea of possible basal bolus as he had high insulin requirement and may be beneficial for wound healing int he setting of his abscess.   Will start insulin pen teaching.  (see provider to RN)   Nutrition consult  He can follow up at Endocrine Office [address] [place] [address] [place] [phone].    2.  Hypertension, unspecified type.    Mgmt per primary team.     3.  Hyperlipidemia, unspecified hyperlipidemia type.   Recommend moderate intensity statin LDL goal <100 46 yr old M with Type 2 DM uncontrolled A1C 8.2 here with submandibular abscess and found to be in DKA.      1. Type 2 diabetes mellitus with ketoacidosis without coma, without long-term current use of insulin.   DKA resolved. Patient states he is eager to start diet.   Patient required about insulin regular 5 units/hour, on dextrose 200 cc/hr  Recommend to start Lantus 20 units daily, given first does, then dc gtt 2 hours afterwards.   Recommend to start Admelog 8 units tid with meals  Recommend LDSS qac/hs     DKA likely precipitated by infection but as SGLT-2 inhibitors can be associated with increased risk of DKA would recommend discontinuation of glyxambi on discharge  If kidney function improves, can consider discharge on metformin ER and semaglutide (oral GLP-1 agonist) (pt prefers oral meds)  Final recs for discharge tbd, I did bring up the idea of possible basal bolus as he had high insulin requirement and may be beneficial for wound healing int he setting of his abscess.   Will start insulin pen teaching.  (see provider to RN)   Nutrition consult  He can follow up at Endocrine Office [address] [place] [address] [place] [phone].    Case discussed with micu resident.   2.  Hypertension, unspecified type.    Mgmt per primary team.     3.  Hyperlipidemia, unspecified hyperlipidemia type.   Recommend moderate intensity statin LDL goal <100

## 2021-04-14 NOTE — CHART NOTE - NSCHARTNOTEFT_GEN_A_CORE
MAR Accept Note  Transfer to: 9N  Accepting Attending Physician:  JADON  Assigned Room:  923A    Patient seen and examined.   Labs and data reviewed.   No findings precluding transfer of service.       HPI/MICU COURSE:   Please refer to MICU transfer note for full details. Briefly, this is a 46 y.o M with PMhx of DM and HLD, recent root canal presented with DKA, admitted to MICU for insulin drip. GAP closed and currently tolerating diet and on basal and premeal insulin (endo following). Also on Zosyn for dental abscess with OMFS following and plan for drain removal once drainages has subsided. Stable for transfer to the floor.       FOR FOLLOW-UP:  - Endo and OMFS recs  - f/u cultures    Phiilppe Marie PGY3  MAR [id number] [facility] / [id number] [address]

## 2021-04-15 DIAGNOSIS — E11.9 TYPE 2 DIABETES MELLITUS WITHOUT COMPLICATIONS: ICD-10-CM

## 2021-04-15 DIAGNOSIS — K12.2 CELLULITIS AND ABSCESS OF MOUTH: ICD-10-CM

## 2021-04-15 DIAGNOSIS — N17.9 ACUTE KIDNEY FAILURE, UNSPECIFIED: ICD-10-CM

## 2021-04-15 DIAGNOSIS — Z29.9 ENCOUNTER FOR PROPHYLACTIC MEASURES, UNSPECIFIED: ICD-10-CM

## 2021-04-15 LAB
ANION GAP SERPL CALC-SCNC: 13 MMOL/L — SIGNIFICANT CHANGE UP (ref 7–14)
BASOPHILS # BLD AUTO: 0.12 K/UL — SIGNIFICANT CHANGE UP (ref 0–0.2)
BASOPHILS NFR BLD AUTO: 1.2 % — SIGNIFICANT CHANGE UP (ref 0–2)
BUN SERPL-MCNC: 25 MG/DL — HIGH (ref 7–23)
CALCIUM SERPL-MCNC: 8.9 MG/DL — SIGNIFICANT CHANGE UP (ref 8.4–10.5)
CHLORIDE SERPL-SCNC: 102 MMOL/L — SIGNIFICANT CHANGE UP (ref 98–107)
CHOLEST SERPL-MCNC: 129 MG/DL — SIGNIFICANT CHANGE UP
CO2 SERPL-SCNC: 22 MMOL/L — SIGNIFICANT CHANGE UP (ref 22–31)
CREAT SERPL-MCNC: 0.78 MG/DL — SIGNIFICANT CHANGE UP (ref 0.5–1.3)
EOSINOPHIL # BLD AUTO: 0.5 K/UL — SIGNIFICANT CHANGE UP (ref 0–0.5)
EOSINOPHIL NFR BLD AUTO: 5 % — SIGNIFICANT CHANGE UP (ref 0–6)
GLUCOSE BLDC GLUCOMTR-MCNC: 130 MG/DL — HIGH (ref 70–99)
GLUCOSE BLDC GLUCOMTR-MCNC: 142 MG/DL — HIGH (ref 70–99)
GLUCOSE BLDC GLUCOMTR-MCNC: 173 MG/DL — HIGH (ref 70–99)
GLUCOSE BLDC GLUCOMTR-MCNC: 183 MG/DL — HIGH (ref 70–99)
GLUCOSE SERPL-MCNC: 132 MG/DL — HIGH (ref 70–99)
HCT VFR BLD CALC: 39 % — SIGNIFICANT CHANGE UP (ref 39–50)
HDLC SERPL-MCNC: 24 MG/DL — LOW
HGB BLD-MCNC: 13.9 G/DL — SIGNIFICANT CHANGE UP (ref 13–17)
IANC: 6.81 K/UL — SIGNIFICANT CHANGE UP (ref 1.5–8.5)
IMM GRANULOCYTES NFR BLD AUTO: 0.6 % — SIGNIFICANT CHANGE UP (ref 0–1.5)
LIPID PNL WITH DIRECT LDL SERPL: 76 MG/DL — SIGNIFICANT CHANGE UP
LYMPHOCYTES # BLD AUTO: 1.31 K/UL — SIGNIFICANT CHANGE UP (ref 1–3.3)
LYMPHOCYTES # BLD AUTO: 13 % — SIGNIFICANT CHANGE UP (ref 13–44)
MAGNESIUM SERPL-MCNC: 1.8 MG/DL — SIGNIFICANT CHANGE UP (ref 1.6–2.6)
MCHC RBC-ENTMCNC: 34.4 PG — HIGH (ref 27–34)
MCHC RBC-ENTMCNC: 35.6 GM/DL — SIGNIFICANT CHANGE UP (ref 32–36)
MCV RBC AUTO: 96.5 FL — SIGNIFICANT CHANGE UP (ref 80–100)
MONOCYTES # BLD AUTO: 1.29 K/UL — HIGH (ref 0–0.9)
MONOCYTES NFR BLD AUTO: 12.8 % — SIGNIFICANT CHANGE UP (ref 2–14)
NEUTROPHILS # BLD AUTO: 6.81 K/UL — SIGNIFICANT CHANGE UP (ref 1.8–7.4)
NEUTROPHILS NFR BLD AUTO: 67.4 % — SIGNIFICANT CHANGE UP (ref 43–77)
NON HDL CHOLESTEROL: 105 MG/DL — SIGNIFICANT CHANGE UP
NRBC # BLD: 0 /100 WBCS — SIGNIFICANT CHANGE UP
NRBC # FLD: 0 K/UL — SIGNIFICANT CHANGE UP
PHOSPHATE SERPL-MCNC: 1.9 MG/DL — LOW (ref 2.5–4.5)
PLATELET # BLD AUTO: 335 K/UL — SIGNIFICANT CHANGE UP (ref 150–400)
POTASSIUM SERPL-MCNC: 3.8 MMOL/L — SIGNIFICANT CHANGE UP (ref 3.5–5.3)
POTASSIUM SERPL-SCNC: 3.8 MMOL/L — SIGNIFICANT CHANGE UP (ref 3.5–5.3)
RBC # BLD: 4.04 M/UL — LOW (ref 4.2–5.8)
RBC # FLD: 11.6 % — SIGNIFICANT CHANGE UP (ref 10.3–14.5)
SODIUM SERPL-SCNC: 137 MMOL/L — SIGNIFICANT CHANGE UP (ref 135–145)
TRIGL SERPL-MCNC: 147 MG/DL — SIGNIFICANT CHANGE UP
WBC # BLD: 10.09 K/UL — SIGNIFICANT CHANGE UP (ref 3.8–10.5)
WBC # FLD AUTO: 10.09 K/UL — SIGNIFICANT CHANGE UP (ref 3.8–10.5)

## 2021-04-15 PROCEDURE — 99232 SBSQ HOSP IP/OBS MODERATE 35: CPT

## 2021-04-15 PROCEDURE — 99233 SBSQ HOSP IP/OBS HIGH 50: CPT | Mod: GC

## 2021-04-15 RX ORDER — SIMVASTATIN 20 MG/1
10 TABLET, FILM COATED ORAL AT BEDTIME
Refills: 0 | Status: DISCONTINUED | OUTPATIENT
Start: 2021-04-15 | End: 2021-04-16

## 2021-04-15 RX ORDER — SEMAGLUTIDE 0.68 MG/ML
1 INJECTION, SOLUTION SUBCUTANEOUS
Qty: 30 | Refills: 0
Start: 2021-04-15 | End: 2021-05-14

## 2021-04-15 RX ORDER — POTASSIUM PHOSPHATE, MONOBASIC POTASSIUM PHOSPHATE, DIBASIC 236; 224 MG/ML; MG/ML
30 INJECTION, SOLUTION INTRAVENOUS ONCE
Refills: 0 | Status: COMPLETED | OUTPATIENT
Start: 2021-04-15 | End: 2021-04-15

## 2021-04-15 RX ORDER — INSULIN GLARGINE 100 [IU]/ML
20 INJECTION, SOLUTION SUBCUTANEOUS
Refills: 0 | Status: DISCONTINUED | OUTPATIENT
Start: 2021-04-16 | End: 2021-04-16

## 2021-04-15 RX ORDER — OXYCODONE HYDROCHLORIDE 5 MG/1
5 TABLET ORAL EVERY 6 HOURS
Refills: 0 | Status: DISCONTINUED | OUTPATIENT
Start: 2021-04-15 | End: 2021-04-16

## 2021-04-15 RX ORDER — INSULIN GLARGINE 100 [IU]/ML
20 INJECTION, SOLUTION SUBCUTANEOUS ONCE
Refills: 0 | Status: COMPLETED | OUTPATIENT
Start: 2021-04-15 | End: 2021-04-15

## 2021-04-15 RX ORDER — ACETAMINOPHEN 500 MG
650 TABLET ORAL EVERY 6 HOURS
Refills: 0 | Status: DISCONTINUED | OUTPATIENT
Start: 2021-04-15 | End: 2021-04-16

## 2021-04-15 RX ADMIN — Medication 8 UNIT(S): at 12:35

## 2021-04-15 RX ADMIN — HEPARIN SODIUM 5000 UNIT(S): 5000 INJECTION INTRAVENOUS; SUBCUTANEOUS at 13:47

## 2021-04-15 RX ADMIN — HYDROMORPHONE HYDROCHLORIDE 1 MILLIGRAM(S): 2 INJECTION INTRAMUSCULAR; INTRAVENOUS; SUBCUTANEOUS at 08:04

## 2021-04-15 RX ADMIN — POTASSIUM PHOSPHATE, MONOBASIC POTASSIUM PHOSPHATE, DIBASIC 83.33 MILLIMOLE(S): 236; 224 INJECTION, SOLUTION INTRAVENOUS at 13:46

## 2021-04-15 RX ADMIN — OXYCODONE HYDROCHLORIDE 5 MILLIGRAM(S): 5 TABLET ORAL at 23:40

## 2021-04-15 RX ADMIN — SIMVASTATIN 10 MILLIGRAM(S): 20 TABLET, FILM COATED ORAL at 21:14

## 2021-04-15 RX ADMIN — Medication 8 UNIT(S): at 17:37

## 2021-04-15 RX ADMIN — HYDROMORPHONE HYDROCHLORIDE 0.5 MILLIGRAM(S): 2 INJECTION INTRAMUSCULAR; INTRAVENOUS; SUBCUTANEOUS at 03:08

## 2021-04-15 RX ADMIN — HEPARIN SODIUM 5000 UNIT(S): 5000 INJECTION INTRAVENOUS; SUBCUTANEOUS at 05:37

## 2021-04-15 RX ADMIN — PIPERACILLIN AND TAZOBACTAM 25 GRAM(S): 4; .5 INJECTION, POWDER, LYOPHILIZED, FOR SOLUTION INTRAVENOUS at 21:14

## 2021-04-15 RX ADMIN — Medication 1: at 12:35

## 2021-04-15 RX ADMIN — Medication 1: at 08:48

## 2021-04-15 RX ADMIN — HYDROMORPHONE HYDROCHLORIDE 1 MILLIGRAM(S): 2 INJECTION INTRAMUSCULAR; INTRAVENOUS; SUBCUTANEOUS at 08:20

## 2021-04-15 RX ADMIN — CHLORHEXIDINE GLUCONATE 1 APPLICATION(S): 213 SOLUTION TOPICAL at 05:37

## 2021-04-15 RX ADMIN — INSULIN GLARGINE 20 UNIT(S): 100 INJECTION, SOLUTION SUBCUTANEOUS at 13:46

## 2021-04-15 RX ADMIN — Medication 650 MILLIGRAM(S): at 17:37

## 2021-04-15 RX ADMIN — PIPERACILLIN AND TAZOBACTAM 25 GRAM(S): 4; .5 INJECTION, POWDER, LYOPHILIZED, FOR SOLUTION INTRAVENOUS at 05:37

## 2021-04-15 RX ADMIN — OXYCODONE HYDROCHLORIDE 5 MILLIGRAM(S): 5 TABLET ORAL at 22:45

## 2021-04-15 RX ADMIN — HYDROMORPHONE HYDROCHLORIDE 0.5 MILLIGRAM(S): 2 INJECTION INTRAMUSCULAR; INTRAVENOUS; SUBCUTANEOUS at 03:23

## 2021-04-15 RX ADMIN — Medication 8 UNIT(S): at 08:48

## 2021-04-15 RX ADMIN — PIPERACILLIN AND TAZOBACTAM 25 GRAM(S): 4; .5 INJECTION, POWDER, LYOPHILIZED, FOR SOLUTION INTRAVENOUS at 13:46

## 2021-04-15 NOTE — PROGRESS NOTE ADULT - ASSESSMENT
46 y.o M with PMhx of DM presents to the ED with jaw swelling after root canal (4/7) found to have DKA and potential developing phlegmon/abscess in the right platysma gap now closed, transferred out of MICU on 4/15.

## 2021-04-15 NOTE — PROGRESS NOTE ADULT - PROBLEM SELECTOR PLAN 6
DVT ppx: hep subq  Diet: CC  Disposition:  Code Status: DVT ppx: hep subq  Diet: CC  Disposition: pending FS control  Code Status:

## 2021-04-15 NOTE — PROGRESS NOTE ADULT - SUBJECTIVE AND OBJECTIVE BOX
Patient is a 46y Male with PMH significant for DM, had root canal on tooth #29 on 4/7, now p/w with worsening swelling below jaw. red, warm. has odynophagia but no dysphagia. no sob. no fevers or chills. has been on clinda x5d. Patient is s/p exo #29 in dental clinic on 04/12/2021. Transfer from MICU to floor 4/15/2021    PAST MEDICAL & SURGICAL HISTORY:  DM (diabetes mellitus)  Atopic dermatitis    MEDICATIONS  (STANDING):  chlorhexidine 4% Liquid 1 Application(s) Topical <User Schedule>  dextrose 40% Gel 15 Gram(s) Oral once  dextrose 5%. 1000 milliLiter(s) (100 mL/Hr) IV Continuous <Continuous>  dextrose 5%. 1000 milliLiter(s) (50 mL/Hr) IV Continuous <Continuous>  dextrose 50% Injectable 25 Gram(s) IV Push once  dextrose 50% Injectable 12.5 Gram(s) IV Push once  dextrose 50% Injectable 25 Gram(s) IV Push once  glucagon  Injectable 1 milliGRAM(s) IntraMuscular once  heparin   Injectable 5000 Unit(s) SubCutaneous every 8 hours  insulin glargine Injectable (LANTUS) 20 Unit(s) SubCutaneous at bedtime  insulin lispro (ADMELOG) corrective regimen sliding scale   SubCutaneous three times a day before meals  insulin lispro (ADMELOG) corrective regimen sliding scale   SubCutaneous at bedtime  insulin lispro Injectable (ADMELOG) 8 Unit(s) SubCutaneous three times a day before meals  piperacillin/tazobactam IVPB.. 3.375 Gram(s) IV Intermittent every 8 hours    MEDICATIONS  (PRN):  HYDROmorphone  Injectable 0.5 milliGRAM(s) IV Push every 4 hours PRN Moderate Pain (4 - 6)  HYDROmorphone  Injectable 1 milliGRAM(s) IV Push every 4 hours PRN Severe Pain (7 - 10)    Allergies No Known Allergies    Vital Signs Last 24 Hrs  T(C): 36.7 (15 Apr 2021 03:07), Max: 37 (14 Apr 2021 20:00)  T(F): 98 (15 Apr 2021 03:07), Max: 98.6 (14 Apr 2021 20:00)  HR: 79 (15 Apr 2021 03:07) (68 - 85)  BP: 137/79 (15 Apr 2021 03:07) (120/76 - 140/81)  BP(mean): 93 (14 Apr 2021 23:00) (86 - 101)  RR: 18 (15 Apr 2021 03:07) (11 - 18)  SpO2: 97% (15 Apr 2021 03:07) (93% - 100%)      LABS:              13.9   10.09 )-----------( 335      ( 15 Apr 2021 07:43 )             39.0     04-14    139  |  110<H>  |  63<H>  ----------------------------<  170<H>  4.0   |  21<L>  |  1.29    Ca    8.7      14 Apr 2021 09:31  Phos  2.6     04-14  Mg     2.8     04-14    TPro  7.1  /  Alb  3.3  /  TBili  0.4  /  DBili  x   /  AST  15  /  ALT  10  /  AlkPhos  82  04-14  PT/INR - ( 14 Apr 2021 02:55 )   PT: 15.4 sec;   INR: 1.37 ratio    PTT - ( 14 Apr 2021 02:55 )  PTT:44.9 sec  LIVER FUNCTIONS - ( 14 Apr 2021 02:55 )  Alb: 3.3 g/dL / Pro: 7.1 g/dL / ALK PHOS: 82 U/L / ALT: 10 U/L / AST: 15 U/L / GGT: x             I&O's Summary    14 Apr 2021 07:01  -  15 Apr 2021 07:00  --------------------------------------------------------  IN: 1430.5 mL / OUT: 1800 mL / NET: -369.5 mL        CARDIAC MARKERS ( 14 Apr 2021 02:55 )  x     / x     / 16 U/L / x     / x         /     CAPILLARY BLOOD GLUCOSE  POCT Blood Glucose.: 102 mg/dL (14 Apr 2021 22:39)  POCT Blood Glucose.: 133 mg/dL (14 Apr 2021 17:28)  POCT Blood Glucose.: 118 mg/dL (14 Apr 2021 14:08)  POCT Blood Glucose.: 121 mg/dL (14 Apr 2021 13:18)  POCT Blood Glucose.: 150 mg/dL (14 Apr 2021 11:46)  POCT Blood Glucose.: 160 mg/dL (14 Apr 2021 10:45)  POCT Blood Glucose.: 159 mg/dL (14 Apr 2021 09:30)  POCT Blood Glucose.: 152 mg/dL (14 Apr 2021 08:32)        PE  Gen; AAOx3, NC/ND, atraumatic  EOE: Extra oral submental cellulitic swelling noted that is tender to touch.  IOE:  Penrose drain x1 in extration site #29. scant purluent drainage. site irrigated with sterile saline.      Patient is a 46y Male with PMH significant for DM, had root canal on tooth #29 on 4/7, now p/w with worsening swelling below jaw. red, warm. has odynophagia but no dysphagia. no sob. no fevers or chills. has been on clinda x5d. Patient is s/p exo #29 in dental clinic on 04/12/2021. Transfer from MICU to floor 4/15/2021    PAST MEDICAL & SURGICAL HISTORY:  DM (diabetes mellitus)  Atopic dermatitis    MEDICATIONS  (STANDING):  chlorhexidine 4% Liquid 1 Application(s) Topical <User Schedule>  dextrose 40% Gel 15 Gram(s) Oral once  dextrose 5%. 1000 milliLiter(s) (100 mL/Hr) IV Continuous <Continuous>  dextrose 5%. 1000 milliLiter(s) (50 mL/Hr) IV Continuous <Continuous>  dextrose 50% Injectable 25 Gram(s) IV Push once  dextrose 50% Injectable 12.5 Gram(s) IV Push once  dextrose 50% Injectable 25 Gram(s) IV Push once  glucagon  Injectable 1 milliGRAM(s) IntraMuscular once  heparin   Injectable 5000 Unit(s) SubCutaneous every 8 hours  insulin glargine Injectable (LANTUS) 20 Unit(s) SubCutaneous at bedtime  insulin lispro (ADMELOG) corrective regimen sliding scale   SubCutaneous three times a day before meals  insulin lispro (ADMELOG) corrective regimen sliding scale   SubCutaneous at bedtime  insulin lispro Injectable (ADMELOG) 8 Unit(s) SubCutaneous three times a day before meals  piperacillin/tazobactam IVPB.. 3.375 Gram(s) IV Intermittent every 8 hours    MEDICATIONS  (PRN):  HYDROmorphone  Injectable 0.5 milliGRAM(s) IV Push every 4 hours PRN Moderate Pain (4 - 6)  HYDROmorphone  Injectable 1 milliGRAM(s) IV Push every 4 hours PRN Severe Pain (7 - 10)    Allergies No Known Allergies    Vital Signs Last 24 Hrs  T(C): 36.7 (15 Apr 2021 03:07), Max: 37 (14 Apr 2021 20:00)  T(F): 98 (15 Apr 2021 03:07), Max: 98.6 (14 Apr 2021 20:00)  HR: 79 (15 Apr 2021 03:07) (68 - 85)  BP: 137/79 (15 Apr 2021 03:07) (120/76 - 140/81)  BP(mean): 93 (14 Apr 2021 23:00) (86 - 101)  RR: 18 (15 Apr 2021 03:07) (11 - 18)  SpO2: 97% (15 Apr 2021 03:07) (93% - 100%)      LABS:              13.9   10.09 )-----------( 335      ( 15 Apr 2021 07:43 )             39.0     04-14    139  |  110<H>  |  63<H>  ----------------------------<  170<H>  4.0   |  21<L>  |  1.29    Ca    8.7      14 Apr 2021 09:31  Phos  2.6     04-14  Mg     2.8     04-14    TPro  7.1  /  Alb  3.3  /  TBili  0.4  /  DBili  x   /  AST  15  /  ALT  10  /  AlkPhos  82  04-14  PT/INR - ( 14 Apr 2021 02:55 )   PT: 15.4 sec;   INR: 1.37 ratio    PTT - ( 14 Apr 2021 02:55 )  PTT:44.9 sec  LIVER FUNCTIONS - ( 14 Apr 2021 02:55 )  Alb: 3.3 g/dL / Pro: 7.1 g/dL / ALK PHOS: 82 U/L / ALT: 10 U/L / AST: 15 U/L / GGT: x             I&O's Summary    14 Apr 2021 07:01  -  15 Apr 2021 07:00  --------------------------------------------------------  IN: 1430.5 mL / OUT: 1800 mL / NET: -369.5 mL        CARDIAC MARKERS ( 14 Apr 2021 02:55 )  x     / x     / 16 U/L / x     / x         /     CAPILLARY BLOOD GLUCOSE  POCT Blood Glucose.: 102 mg/dL (14 Apr 2021 22:39)  POCT Blood Glucose.: 133 mg/dL (14 Apr 2021 17:28)  POCT Blood Glucose.: 118 mg/dL (14 Apr 2021 14:08)  POCT Blood Glucose.: 121 mg/dL (14 Apr 2021 13:18)  POCT Blood Glucose.: 150 mg/dL (14 Apr 2021 11:46)  POCT Blood Glucose.: 160 mg/dL (14 Apr 2021 10:45)  POCT Blood Glucose.: 159 mg/dL (14 Apr 2021 09:30)  POCT Blood Glucose.: 152 mg/dL (14 Apr 2021 08:32)        PE  Gen; AAOx3, NC/ND, atraumatic  EOE: Extra oral submental non-erythematous resolving mild cellulitic swelling noted that is tender to touch.  IOE:  Penrose drain x1 in extration site #29. no purluent drainage, granulation tissue and fibrin clotting present. site irrigated with sterile saline.

## 2021-04-15 NOTE — PROGRESS NOTE ADULT - PROBLEM SELECTOR PLAN 1
-patient with submandibular swelling, airway not compromised on scope by ENT  -CXR unremarkable, on room air  -with noted leukocytosis +/- component of hemoconcentration due to volume contracted state  -initiated on vanc/zosyn, c/w zosyn for now  -BCx with NGTD  -appreciate dental and OMFS recs  -c/w drain -patient with submandibular swelling, airway not compromised on scope by ENT  -CXR unremarkable, on room air  -with noted leukocytosis +/- component of hemoconcentration due to volume contracted state  -initiated on vanc/zosyn, c/w zosyn for now  -BCx with NGTD  -appreciate dental and OMFS recs - drain removal prior to discharge, re-eval on 4/15/2021 in PM -patient with submandibular swelling, airway not compromised on scope by ENT  -CXR unremarkable, on room air  -with noted leukocytosis +/- component of hemoconcentration due to volume contracted state  -initiated on vanc/zosyn, c/w zosyn for now  -BCx with NGTD  -appreciate dental and OMFS recs - drain removal prior to discharge, re-eval on 4/15/2021 in PM, f/u regarding abx regimen upon discharge

## 2021-04-15 NOTE — PROGRESS NOTE ADULT - PROBLEM SELECTOR PLAN 2
-likely pre-renal given severe fluid depletion in setting of DKA  -greatly improved after fluid resuscitation -reports last A1c was approximately 6-7  -only on metformin and glyxambi at home  -Endocrinology c/s - c/w Lantus 20 units daily,  Admelog 8 units tid with meals, LDSS qac/hs  - f/u endo recs for discharge planning

## 2021-04-15 NOTE — PROGRESS NOTE ADULT - PROBLEM SELECTOR PLAN 4
-reports last A1c was approximately 6-7  -only on metformin and glyxambi at home  -started in on insulin gtt at 9U/hr, now titrating off  -initiating basal-bolus overlap  -will initiate DKA protocol while in the unit  -Endocrinology c/s - c/w Lantus 20 units daily,  Admelog 8 units tid with meals, LDSS qac/hs -reports last A1c was approximately 6-7  -only on metformin and glyxambi at home  -Endocrinology c/s - c/w Lantus 20 units daily,  Admelog 8 units tid with meals, LDSS qac/hs -reports last A1c was approximately 6-7  -only on metformin and glyxambi at home  -Endocrinology c/s - c/w Lantus 20 units daily,  Admelog 8 units tid with meals, LDSS qac/hs  - f/u endo recs for discharge planning Stable. Monitor for now. Consider ACEi/ARB therapy if BP/Cr allows.

## 2021-04-15 NOTE — PROGRESS NOTE ADULT - SUBJECTIVE AND OBJECTIVE BOX
PROGRESS NOTE:   Authored by Nikky Garcia MD  Pager [phone]  Pager [phone]    Patient is a 46y old  Male who presents with a chief complaint of Facial Abscess and DKA (14 Apr 2021 12:58)      SUBJECTIVE / OVERNIGHT EVENTS:    MEDICATIONS  (STANDING):  chlorhexidine 4% Liquid 1 Application(s) Topical <User Schedule>  dextrose 40% Gel 15 Gram(s) Oral once  dextrose 5%. 1000 milliLiter(s) (50 mL/Hr) IV Continuous <Continuous>  dextrose 5%. 1000 milliLiter(s) (100 mL/Hr) IV Continuous <Continuous>  dextrose 50% Injectable 25 Gram(s) IV Push once  dextrose 50% Injectable 12.5 Gram(s) IV Push once  dextrose 50% Injectable 25 Gram(s) IV Push once  glucagon  Injectable 1 milliGRAM(s) IntraMuscular once  heparin   Injectable 5000 Unit(s) SubCutaneous every 8 hours  insulin glargine Injectable (LANTUS) 20 Unit(s) SubCutaneous at bedtime  insulin lispro (ADMELOG) corrective regimen sliding scale   SubCutaneous three times a day before meals  insulin lispro (ADMELOG) corrective regimen sliding scale   SubCutaneous at bedtime  insulin lispro Injectable (ADMELOG) 8 Unit(s) SubCutaneous three times a day before meals  piperacillin/tazobactam IVPB.. 3.375 Gram(s) IV Intermittent every 8 hours    MEDICATIONS  (PRN):  HYDROmorphone  Injectable 0.5 milliGRAM(s) IV Push every 4 hours PRN Moderate Pain (4 - 6)  HYDROmorphone  Injectable 1 milliGRAM(s) IV Push every 4 hours PRN Severe Pain (7 - 10)      CAPILLARY BLOOD GLUCOSE      POCT Blood Glucose.: 102 mg/dL (14 Apr 2021 22:39)  POCT Blood Glucose.: 133 mg/dL (14 Apr 2021 17:28)  POCT Blood Glucose.: 118 mg/dL (14 Apr 2021 14:08)  POCT Blood Glucose.: 121 mg/dL (14 Apr 2021 13:18)  POCT Blood Glucose.: 150 mg/dL (14 Apr 2021 11:46)  POCT Blood Glucose.: 160 mg/dL (14 Apr 2021 10:45)  POCT Blood Glucose.: 159 mg/dL (14 Apr 2021 09:30)  POCT Blood Glucose.: 152 mg/dL (14 Apr 2021 08:32)  POCT Blood Glucose.: 143 mg/dL (14 Apr 2021 07:22)    I&O's Summary    14 Apr 2021 07:01  -  15 Apr 2021 07:00  --------------------------------------------------------  IN: 1430.5 mL / OUT: 1800 mL / NET: -369.5 mL        PHYSICAL EXAM:  Vital Signs Last 24 Hrs  T(C): 36.7 (15 Apr 2021 03:07), Max: 37 (14 Apr 2021 20:00)  T(F): 98 (15 Apr 2021 03:07), Max: 98.6 (14 Apr 2021 20:00)  HR: 79 (15 Apr 2021 03:07) (68 - 99)  BP: 137/79 (15 Apr 2021 03:07) (120/76 - 140/91)  BP(mean): 93 (14 Apr 2021 23:00) (86 - 106)  RR: 18 (15 Apr 2021 03:07) (11 - 21)  SpO2: 97% (15 Apr 2021 03:07) (93% - 100%)    GENERAL: No acute distress, well-developed  HEAD:  Atraumatic, Normocephalic  EYES: EOMI, PERRLA, conjunctiva and sclera clear  NECK: Supple, no lymphadenopathy, no JVD  CHEST/LUNG: CTAB; No wheezes, rales, or rhonchi  HEART: Regular rate and rhythm; No murmurs, rubs, or gallops  ABDOMEN: Soft, non-tender, non-distended; normal bowel sounds, no organomegaly  EXTREMITIES:  2+ peripheral pulses b/l, No clubbing, cyanosis, or edema  NEUROLOGY: A&O x 3, no focal deficits  SKIN: No rashes or lesions    LABS:                        14.7   12.44 )-----------( 313      ( 14 Apr 2021 02:55 )             40.6     04-14    139  |  110<H>  |  63<H>  ----------------------------<  170<H>  4.0   |  21<L>  |  1.29    Ca    8.7      14 Apr 2021 09:31  Phos  2.6     04-14  Mg     2.8     04-14    TPro  7.1  /  Alb  3.3  /  TBili  0.4  /  DBili  x   /  AST  15  /  ALT  10  /  AlkPhos  82  04-14    PT/INR - ( 14 Apr 2021 02:55 )   PT: 15.4 sec;   INR: 1.37 ratio         PTT - ( 14 Apr 2021 02:55 )  PTT:44.9 sec  CARDIAC MARKERS ( 14 Apr 2021 02:55 )  x     / x     / 16 U/L / x     / x              Culture - Abscess with Gram Stain (collected 12 Apr 2021 20:55)  Source: .Abscess mouth  Preliminary Report (13 Apr 2021 15:43):    Routine mouth aftab    Culture - Blood (collected 12 Apr 2021 14:13)  Source: .Blood Blood-Peripheral  Preliminary Report (13 Apr 2021 15:01):    No growth to date.    Culture - Blood (collected 12 Apr 2021 14:11)  Source: .Blood Blood-Peripheral  Preliminary Report (13 Apr 2021 15:01):    No growth to date.        RADIOLOGY & ADDITIONAL TESTS:  Results Reviewed:   Imaging Personally Reviewed:  Electrocardiogram Personally Reviewed:    < from: CT Neck Soft Tissue No Cont (04.12.21 @ 13:16) >  IMPRESSION:    The right palatine tonsil is asymmetrically larger than the left. No focal abscess. Direct visualization is recommended.    Developing submandibular phlegmon or abscess adjacent to the right platysma 2.9 x 3.6 x 0.9 cm thick.    < end of copied text >      COORDINATION OF CARE:  Care Discussed with Consultants/Other Providers [Y/N]:  Prior or Outpatient Records Reviewed [Y/N]:   PROGRESS NOTE:   Authored by Nikky Garcia MD  Pager [phone]  Pager [phone]    Patient is a 46y old  Male who presents with a chief complaint of Facial Abscess and DKA (14 Apr 2021 12:58)      SUBJECTIVE / OVERNIGHT EVENTS: Patient downgraded from MICU overnight. This AM patient states that the swelling and pain is still present but improved since admission and intervention. Denies headaches, F/C, dizziness, syncope, CP/SOB, N/V, abdominal pain, dysuria, changes in BM, peripheral swelling, skin changes, new joint aches. Tolerating PO, ambulatory.     MEDICATIONS  (STANDING):  chlorhexidine 4% Liquid 1 Application(s) Topical <User Schedule>  dextrose 40% Gel 15 Gram(s) Oral once  dextrose 5%. 1000 milliLiter(s) (50 mL/Hr) IV Continuous <Continuous>  dextrose 5%. 1000 milliLiter(s) (100 mL/Hr) IV Continuous <Continuous>  dextrose 50% Injectable 25 Gram(s) IV Push once  dextrose 50% Injectable 12.5 Gram(s) IV Push once  dextrose 50% Injectable 25 Gram(s) IV Push once  glucagon  Injectable 1 milliGRAM(s) IntraMuscular once  heparin   Injectable 5000 Unit(s) SubCutaneous every 8 hours  insulin glargine Injectable (LANTUS) 20 Unit(s) SubCutaneous at bedtime  insulin lispro (ADMELOG) corrective regimen sliding scale   SubCutaneous three times a day before meals  insulin lispro (ADMELOG) corrective regimen sliding scale   SubCutaneous at bedtime  insulin lispro Injectable (ADMELOG) 8 Unit(s) SubCutaneous three times a day before meals  piperacillin/tazobactam IVPB.. 3.375 Gram(s) IV Intermittent every 8 hours    MEDICATIONS  (PRN):  HYDROmorphone  Injectable 0.5 milliGRAM(s) IV Push every 4 hours PRN Moderate Pain (4 - 6)  HYDROmorphone  Injectable 1 milliGRAM(s) IV Push every 4 hours PRN Severe Pain (7 - 10)      CAPILLARY BLOOD GLUCOSE      POCT Blood Glucose.: 102 mg/dL (14 Apr 2021 22:39)  POCT Blood Glucose.: 133 mg/dL (14 Apr 2021 17:28)  POCT Blood Glucose.: 118 mg/dL (14 Apr 2021 14:08)  POCT Blood Glucose.: 121 mg/dL (14 Apr 2021 13:18)  POCT Blood Glucose.: 150 mg/dL (14 Apr 2021 11:46)  POCT Blood Glucose.: 160 mg/dL (14 Apr 2021 10:45)  POCT Blood Glucose.: 159 mg/dL (14 Apr 2021 09:30)  POCT Blood Glucose.: 152 mg/dL (14 Apr 2021 08:32)  POCT Blood Glucose.: 143 mg/dL (14 Apr 2021 07:22)    I&O's Summary    14 Apr 2021 07:01  -  15 Apr 2021 07:00  --------------------------------------------------------  IN: 1430.5 mL / OUT: 1800 mL / NET: -369.5 mL        PHYSICAL EXAM:  Vital Signs Last 24 Hrs  T(C): 36.7 (15 Apr 2021 03:07), Max: 37 (14 Apr 2021 20:00)  T(F): 98 (15 Apr 2021 03:07), Max: 98.6 (14 Apr 2021 20:00)  HR: 79 (15 Apr 2021 03:07) (68 - 99)  BP: 137/79 (15 Apr 2021 03:07) (120/76 - 140/91)  BP(mean): 93 (14 Apr 2021 23:00) (86 - 106)  RR: 18 (15 Apr 2021 03:07) (11 - 21)  SpO2: 97% (15 Apr 2021 03:07) (93% - 100%)    GENERAL: No acute distress, well-developed  HEAD:  Atraumatic, Normocephalic  EYES: EOMI, PERRLA, conjunctiva and sclera clear  NECK: Supple, no lymphadenopathy, no JVD, Extra oral submental non-erythematous resolving mild cellulitic swelling noted that is tender to touch.  CHEST/LUNG: CTAB; No wheezes, rales, or rhonchi  HEART: Regular rate and rhythm; No murmurs, rubs, or gallops  ABDOMEN: Soft, non-tender, +distended; normal bowel sounds, no organomegaly  EXTREMITIES:  2+ peripheral pulses b/l, No clubbing, cyanosis, or edema  NEUROLOGY: A&O x 3, no focal deficits  SKIN: hyperpigmented chronic rash covering shins bilaterally miildly pruritic    LABS:                        14.7   12.44 )-----------( 313      ( 14 Apr 2021 02:55 )             40.6     04-14    139  |  110<H>  |  63<H>  ----------------------------<  170<H>  4.0   |  21<L>  |  1.29    Ca    8.7      14 Apr 2021 09:31  Phos  2.6     04-14  Mg     2.8     04-14    TPro  7.1  /  Alb  3.3  /  TBili  0.4  /  DBili  x   /  AST  15  /  ALT  10  /  AlkPhos  82  04-14    PT/INR - ( 14 Apr 2021 02:55 )   PT: 15.4 sec;   INR: 1.37 ratio         PTT - ( 14 Apr 2021 02:55 )  PTT:44.9 sec  CARDIAC MARKERS ( 14 Apr 2021 02:55 )  x     / x     / 16 U/L / x     / x              Culture - Abscess with Gram Stain (collected 12 Apr 2021 20:55)  Source: .Abscess mouth  Preliminary Report (13 Apr 2021 15:43):    Routine mouth aftab    Culture - Blood (collected 12 Apr 2021 14:13)  Source: .Blood Blood-Peripheral  Preliminary Report (13 Apr 2021 15:01):    No growth to date.    Culture - Blood (collected 12 Apr 2021 14:11)  Source: .Blood Blood-Peripheral  Preliminary Report (13 Apr 2021 15:01):    No growth to date.        RADIOLOGY & ADDITIONAL TESTS:  Results Reviewed:   Imaging Personally Reviewed:  Electrocardiogram Personally Reviewed:    < from: CT Neck Soft Tissue No Cont (04.12.21 @ 13:16) >  IMPRESSION:    The right palatine tonsil is asymmetrically larger than the left. No focal abscess. Direct visualization is recommended.    Developing submandibular phlegmon or abscess adjacent to the right platysma 2.9 x 3.6 x 0.9 cm thick.    < end of copied text >      COORDINATION OF CARE:  Care Discussed with Consultants/Other Providers [Y/N]:  Prior or Outpatient Records Reviewed [Y/N]:

## 2021-04-15 NOTE — PROGRESS NOTE ADULT - ASSESSMENT
46 year old male with DKA s/p exo and I&D #29 with Penrose drain placement on 4/12/2021 - progressing well    - care as per primary  - drain removal once purluent drainage subsides, re-eval on 4/15/2021 in PM  - drain removal prior to discharge 46 year old male with DKA s/p exo and I&D #29 with Penrose drain placement on 4/12/2021 - progressing well    - care as per primary  - drain removal prior to discharge, re-eval on 4/15/2021 in PM  - patient request non-drowsy pain control for discharge which will allow him to continue to work

## 2021-04-15 NOTE — PROGRESS NOTE ADULT - SUBJECTIVE AND OBJECTIVE BOX
Interval History/Subjective:  Patient feels well.  States that he is hungry.  Is willing to do insulin for a short while after discharge to achieve glycemic goal.      MEDICATIONS  (STANDING):  chlorhexidine 4% Liquid 1 Application(s) Topical <User Schedule>  dextrose 40% Gel 15 Gram(s) Oral once  dextrose 5% + lactated ringers with potassium chloride 40 mEq/L 1000 milliLiter(s) (200 mL/Hr) IV Continuous <Continuous>  dextrose 5%. 1000 milliLiter(s) (50 mL/Hr) IV Continuous <Continuous>  dextrose 5%. 1000 milliLiter(s) (100 mL/Hr) IV Continuous <Continuous>  dextrose 50% Injectable 25 Gram(s) IV Push once  dextrose 50% Injectable 12.5 Gram(s) IV Push once  dextrose 50% Injectable 25 Gram(s) IV Push once  glucagon  Injectable 1 milliGRAM(s) IntraMuscular once  heparin   Injectable 5000 Unit(s) SubCutaneous every 8 hours  insulin lispro (ADMELOG) corrective regimen sliding scale   SubCutaneous three times a day before meals  insulin lispro (ADMELOG) corrective regimen sliding scale   SubCutaneous at bedtime  insulin lispro Injectable (ADMELOG) 8 Unit(s) SubCutaneous three times a day before meals  insulin regular Infusion 5 Unit(s)/Hr (5 mL/Hr) IV Continuous <Continuous>  piperacillin/tazobactam IVPB.. 3.375 Gram(s) IV Intermittent every 8 hours    MEDICATIONS  (PRN):  HYDROmorphone  Injectable 0.5 milliGRAM(s) IV Push every 4 hours PRN Moderate Pain (4 - 6)  HYDROmorphone  Injectable 1 milliGRAM(s) IV Push every 4 hours PRN Severe Pain (7 - 10)      Allergies    No Known Allergies    Intolerances      Review of Systems:  Constitutional: No fever  Eyes: No blurry vision      ALL OTHER SYSTEMS REVIEWED AND NEGATIVE    PHYSICAL EXAM:  Vital Signs Last 24 Hrs  T(C): 36.9 (15 Apr 2021 13:17), Max: 37 (14 Apr 2021 20:00)  T(F): 98.4 (15 Apr 2021 13:17), Max: 98.6 (14 Apr 2021 20:00)  HR: 72 (15 Apr 2021 13:17) (72 - 83)  BP: 119/78 (15 Apr 2021 13:17) (119/78 - 140/81)  BP(mean): 93 (14 Apr 2021 23:00) (90 - 93)  RR: 18 (15 Apr 2021 13:17) (16 - 18)  SpO2: 99% (15 Apr 2021 13:17) (94% - 100%)  GENERAL: NAD  EYES: No proptosis, no lid lag, anicteric  HEENT:  Atraumatic, Normocephalic, moist mucous membranes  RESPIRATORY: nonlabored respirations  PSYCH: Alert and oriented x 3, normal affect, normal mood      CAPILLARY BLOOD GLUCOSE      POCT Blood Glucose.: 173 mg/dL (15 Apr 2021 12:16)  POCT Blood Glucose.: 183 mg/dL (15 Apr 2021 08:41)  POCT Blood Glucose.: 102 mg/dL (14 Apr 2021 22:39)  POCT Blood Glucose.: 133 mg/dL (14 Apr 2021 17:28)    POCT Blood Glucose.: 150 mg/dL (04-14-21 @ 11:46)  POCT Blood Glucose.: 160 mg/dL (04-14-21 @ 10:45)  POCT Blood Glucose.: 159 mg/dL (04-14-21 @ 09:30)  POCT Blood Glucose.: 152 mg/dL (04-14-21 @ 08:32)  POCT Blood Glucose.: 143 mg/dL (04-14-21 @ 07:22)  POCT Blood Glucose.: 140 mg/dL (04-14-21 @ 05:48)  POCT Blood Glucose.: 109 mg/dL (04-14-21 @ 04:24)  POCT Blood Glucose.: 114 mg/dL (04-14-21 @ 02:13)  POCT Blood Glucose.: 118 mg/dL (04-14-21 @ 00:26)  POCT Blood Glucose.: 131 mg/dL (04-13-21 @ 22:49)  POCT Blood Glucose.: 136 mg/dL (04-13-21 @ 21:01)  POCT Blood Glucose.: 134 mg/dL (04-13-21 @ 19:54)  POCT Blood Glucose.: 123 mg/dL (04-13-21 @ 18:15)  POCT Blood Glucose.: 104 mg/dL (04-13-21 @ 17:23)  POCT Blood Glucose.: 86 mg/dL (04-13-21 @ 17:19)  POCT Blood Glucose.: 154 mg/dL (04-13-21 @ 16:05)  POCT Blood Glucose.: 140 mg/dL (04-13-21 @ 15:03)  POCT Blood Glucose.: 169 mg/dL (04-13-21 @ 13:52)  POCT Blood Glucose.: 176 mg/dL (04-13-21 @ 12:06)  POCT Blood Glucose.: 225 mg/dL (04-13-21 @ 10:58)  POCT Blood Glucose.: 208 mg/dL (04-13-21 @ 10:04)  POCT Blood Glucose.: 211 mg/dL (04-13-21 @ 09:01)  POCT Blood Glucose.: 222 mg/dL (04-13-21 @ 08:02)  POCT Blood Glucose.: 279 mg/dL (04-13-21 @ 06:32)  POCT Blood Glucose.: 230 mg/dL (04-13-21 @ 05:27)  POCT Blood Glucose.: 237 mg/dL (04-13-21 @ 04:25)  POCT Blood Glucose.: 222 mg/dL (04-13-21 @ 03:14)  POCT Blood Glucose.: 199 mg/dL (04-13-21 @ 02:10)  POCT Blood Glucose.: 169 mg/dL (04-13-21 @ 01:17)  POCT Blood Glucose.: 123 mg/dL (04-13-21 @ 00:08)  POCT Blood Glucose.: 135 mg/dL (04-12-21 @ 23:16)  POCT Blood Glucose.: 150 mg/dL (04-12-21 @ 22:08)  POCT Blood Glucose.: 159 mg/dL (04-12-21 @ 21:16)  POCT Blood Glucose.: 222 mg/dL (04-12-21 @ 19:50)  POCT Blood Glucose.: 261 mg/dL (04-12-21 @ 18:48)  POCT Blood Glucose.: 409 mg/dL (04-12-21 @ 17:42)  POCT Blood Glucose.: 459 mg/dL (04-12-21 @ 16:29)      04-15    137  |  102  |  25<H>  ----------------------------<  132<H>  3.8   |  22  |  0.78    Ca    8.9      15 Apr 2021 07:43  Phos  1.9     04-15  Mg     1.8     04-15    TPro  7.1  /  Alb  3.3  /  TBili  0.4  /  DBili  x   /  AST  15  /  ALT  10  /  AlkPhos  82  04-14    A1C with Estimated Average Glucose (04.12.21 @ 23:45)    A1C with Estimated Average Glucose Result: 8.2 %    Estimated Average Glucose: 189 mg/dL

## 2021-04-15 NOTE — PROGRESS NOTE ADULT - ASSESSMENT
46 yr old M with Type 2 DM uncontrolled A1C 8.2 here with submandibular abscess and found to be in DKA.      1. Type 2 diabetes mellitus with ketoacidosis without coma, without long-term current use of insulin.   DKA resolved. Patient states he is eager to start diet.   Patient required about insulin regular 5 units/hour, on dextrose 200 cc/hr  Continue Lantus 20 units daily q 12p  continue Admelog 8 units tid with meals  Recommend low correction scales for ac and hs    DKA likely precipitated by infection but as SGLT-2 inhibitors can be associated with increased risk of DKA would recommend discontinuation of glyxambi on discharge  If kidney function  has imporved,   Can be started on metformin XR 1000 mg po BID  Can be started on semaglutide (oral GLP-1 agonist) 3 mg PO daily x 30 days, then increase to 7 mg po daily  Endo team previously introduced the idea of possible basal bolus as he had high insulin requirement and may be beneficial for wound healing intt he setting of his abscess. Patient is amenable.  To decide vloser d/c  Will start insulin pen teaching.  (see provider to RN)   Nutrition consult  He can follow up at Endocrine Office [address] [place] [address] [phone].      2.  Hypertension, unspecified type.    Mgmt per primary team.     3.  Hyperlipidemia, unspecified hyperlipidemia type.   Recommend moderate intensity statin LDL goal <100    Hoda Cabrales  Nurse Practitioner  Division of Endocrinology & Diabetes  Pager # [phone]      If after 6PM or before 9AM, or on weekends/holidays, please call endocrine answering service for assistance ([phone]).  For nonurgent matters email [email or web address] for assistance.

## 2021-04-15 NOTE — PROGRESS NOTE ADULT - PROBLEM SELECTOR PLAN 3
Stable. Monitor for now. Consider ACEi/ARB therapy if BP/Cr allows. -likely pre-renal given severe fluid depletion in setting of DKA  -greatly improved after fluid resuscitation

## 2021-04-16 VITALS
TEMPERATURE: 99 F | DIASTOLIC BLOOD PRESSURE: 72 MMHG | OXYGEN SATURATION: 100 % | HEART RATE: 63 BPM | RESPIRATION RATE: 18 BRPM | SYSTOLIC BLOOD PRESSURE: 116 MMHG

## 2021-04-16 DIAGNOSIS — N17.9 ACUTE KIDNEY FAILURE, UNSPECIFIED: ICD-10-CM

## 2021-04-16 LAB
ANION GAP SERPL CALC-SCNC: 10 MMOL/L — SIGNIFICANT CHANGE UP (ref 7–14)
BASOPHILS # BLD AUTO: 0.12 K/UL — SIGNIFICANT CHANGE UP (ref 0–0.2)
BASOPHILS NFR BLD AUTO: 1.6 % — SIGNIFICANT CHANGE UP (ref 0–2)
BUN SERPL-MCNC: 15 MG/DL — SIGNIFICANT CHANGE UP (ref 7–23)
CALCIUM SERPL-MCNC: 9.3 MG/DL — SIGNIFICANT CHANGE UP (ref 8.4–10.5)
CHLORIDE SERPL-SCNC: 103 MMOL/L — SIGNIFICANT CHANGE UP (ref 98–107)
CO2 SERPL-SCNC: 27 MMOL/L — SIGNIFICANT CHANGE UP (ref 22–31)
CREAT SERPL-MCNC: 0.69 MG/DL — SIGNIFICANT CHANGE UP (ref 0.5–1.3)
EOSINOPHIL # BLD AUTO: 0.46 K/UL — SIGNIFICANT CHANGE UP (ref 0–0.5)
EOSINOPHIL NFR BLD AUTO: 6.2 % — HIGH (ref 0–6)
GLUCOSE BLDC GLUCOMTR-MCNC: 140 MG/DL — HIGH (ref 70–99)
GLUCOSE BLDC GLUCOMTR-MCNC: 161 MG/DL — HIGH (ref 70–99)
GLUCOSE SERPL-MCNC: 146 MG/DL — HIGH (ref 70–99)
HCT VFR BLD CALC: 37.4 % — LOW (ref 39–50)
HGB BLD-MCNC: 13.4 G/DL — SIGNIFICANT CHANGE UP (ref 13–17)
IANC: 4.44 K/UL — SIGNIFICANT CHANGE UP (ref 1.5–8.5)
IMM GRANULOCYTES NFR BLD AUTO: 0.5 % — SIGNIFICANT CHANGE UP (ref 0–1.5)
LYMPHOCYTES # BLD AUTO: 1.21 K/UL — SIGNIFICANT CHANGE UP (ref 1–3.3)
LYMPHOCYTES # BLD AUTO: 16.4 % — SIGNIFICANT CHANGE UP (ref 13–44)
MAGNESIUM SERPL-MCNC: 1.6 MG/DL — SIGNIFICANT CHANGE UP (ref 1.6–2.6)
MCHC RBC-ENTMCNC: 34.9 PG — HIGH (ref 27–34)
MCHC RBC-ENTMCNC: 35.8 GM/DL — SIGNIFICANT CHANGE UP (ref 32–36)
MCV RBC AUTO: 97.4 FL — SIGNIFICANT CHANGE UP (ref 80–100)
MONOCYTES # BLD AUTO: 1.11 K/UL — HIGH (ref 0–0.9)
MONOCYTES NFR BLD AUTO: 15 % — HIGH (ref 2–14)
NEUTROPHILS # BLD AUTO: 4.44 K/UL — SIGNIFICANT CHANGE UP (ref 1.8–7.4)
NEUTROPHILS NFR BLD AUTO: 60.3 % — SIGNIFICANT CHANGE UP (ref 43–77)
NRBC # BLD: 0 /100 WBCS — SIGNIFICANT CHANGE UP
NRBC # FLD: 0 K/UL — SIGNIFICANT CHANGE UP
PHOSPHATE SERPL-MCNC: 2.4 MG/DL — LOW (ref 2.5–4.5)
PLATELET # BLD AUTO: 348 K/UL — SIGNIFICANT CHANGE UP (ref 150–400)
POTASSIUM SERPL-MCNC: 4.2 MMOL/L — SIGNIFICANT CHANGE UP (ref 3.5–5.3)
POTASSIUM SERPL-SCNC: 4.2 MMOL/L — SIGNIFICANT CHANGE UP (ref 3.5–5.3)
RBC # BLD: 3.84 M/UL — LOW (ref 4.2–5.8)
RBC # FLD: 11.5 % — SIGNIFICANT CHANGE UP (ref 10.3–14.5)
SODIUM SERPL-SCNC: 140 MMOL/L — SIGNIFICANT CHANGE UP (ref 135–145)
WBC # BLD: 7.38 K/UL — SIGNIFICANT CHANGE UP (ref 3.8–10.5)
WBC # FLD AUTO: 7.38 K/UL — SIGNIFICANT CHANGE UP (ref 3.8–10.5)

## 2021-04-16 PROCEDURE — 99239 HOSP IP/OBS DSCHRG MGMT >30: CPT | Mod: GC

## 2021-04-16 RX ORDER — DIPHENHYDRAMINE HCL 50 MG
25 CAPSULE ORAL ONCE
Refills: 0 | Status: COMPLETED | OUTPATIENT
Start: 2021-04-16 | End: 2021-04-16

## 2021-04-16 RX ORDER — SODIUM,POTASSIUM PHOSPHATES 278-250MG
1 POWDER IN PACKET (EA) ORAL ONCE
Refills: 0 | Status: COMPLETED | OUTPATIENT
Start: 2021-04-16 | End: 2021-04-16

## 2021-04-16 RX ORDER — OXYCODONE HYDROCHLORIDE 5 MG/1
1 TABLET ORAL
Qty: 0 | Refills: 0 | DISCHARGE
Start: 2021-04-16

## 2021-04-16 RX ORDER — ACETAMINOPHEN 500 MG
2 TABLET ORAL
Qty: 0 | Refills: 0 | DISCHARGE
Start: 2021-04-16

## 2021-04-16 RX ORDER — OXYCODONE HYDROCHLORIDE 5 MG/1
1 TABLET ORAL
Qty: 12 | Refills: 0
Start: 2021-04-16 | End: 2021-04-18

## 2021-04-16 RX ORDER — ACETAMINOPHEN 500 MG
2 TABLET ORAL
Qty: 56 | Refills: 0
Start: 2021-04-16 | End: 2021-04-22

## 2021-04-16 RX ORDER — EMPAGLIFLOZIN AND LINAGLIPTIN 10; 5 MG/1; MG/1
1 TABLET, FILM COATED ORAL
Qty: 0 | Refills: 0 | DISCHARGE

## 2021-04-16 RX ORDER — METFORMIN HYDROCHLORIDE 850 MG/1
1 TABLET ORAL
Qty: 60 | Refills: 0
Start: 2021-04-16 | End: 2021-05-15

## 2021-04-16 RX ORDER — METFORMIN HYDROCHLORIDE 850 MG/1
1 TABLET ORAL
Qty: 0 | Refills: 0 | DISCHARGE

## 2021-04-16 RX ADMIN — Medication 1 PACKET(S): at 08:56

## 2021-04-16 RX ADMIN — PIPERACILLIN AND TAZOBACTAM 25 GRAM(S): 4; .5 INJECTION, POWDER, LYOPHILIZED, FOR SOLUTION INTRAVENOUS at 13:47

## 2021-04-16 RX ADMIN — CHLORHEXIDINE GLUCONATE 1 APPLICATION(S): 213 SOLUTION TOPICAL at 06:08

## 2021-04-16 RX ADMIN — Medication 650 MILLIGRAM(S): at 02:04

## 2021-04-16 RX ADMIN — INSULIN GLARGINE 20 UNIT(S): 100 INJECTION, SOLUTION SUBCUTANEOUS at 12:34

## 2021-04-16 RX ADMIN — Medication 650 MILLIGRAM(S): at 06:08

## 2021-04-16 RX ADMIN — PIPERACILLIN AND TAZOBACTAM 25 GRAM(S): 4; .5 INJECTION, POWDER, LYOPHILIZED, FOR SOLUTION INTRAVENOUS at 06:07

## 2021-04-16 RX ADMIN — Medication 25 MILLIGRAM(S): at 02:17

## 2021-04-16 RX ADMIN — Medication 8 UNIT(S): at 12:35

## 2021-04-16 RX ADMIN — Medication 650 MILLIGRAM(S): at 12:35

## 2021-04-16 RX ADMIN — Medication 650 MILLIGRAM(S): at 13:35

## 2021-04-16 RX ADMIN — Medication 8 UNIT(S): at 08:53

## 2021-04-16 RX ADMIN — Medication 1: at 08:54

## 2021-04-16 NOTE — PROGRESS NOTE ADULT - NUTRITIONAL ASSESSMENT
This patient has been assessed with a concern for Malnutrition and has been determined to have a diagnosis/diagnoses of Severe protein-calorie malnutrition.    This patient is being managed with:   Diet Soft-  Consistent Carbohydrate {Evening Snack} (CSTCHOSN)  Entered: Apr 14 2021  1:16PM    
This patient has been assessed with a concern for Malnutrition and has been determined to have a diagnosis/diagnoses of Severe protein-calorie malnutrition.    This patient is being managed with:   Diet Soft-  Consistent Carbohydrate {Evening Snack} (CSTCHOSN)  Entered: Apr 14 2021  1:16PM

## 2021-04-16 NOTE — DISCHARGE NOTE PROVIDER - DETAILS OF MALNUTRITION DIAGNOSIS/DIAGNOSES
This patient has been assessed with a concern for Malnutrition and was treated during this hospitalization for the following Nutrition diagnosis/diagnoses:     -  04/13/2021: Severe protein-calorie malnutrition

## 2021-04-16 NOTE — PROGRESS NOTE ADULT - PROBLEM SELECTOR PROBLEM 1
Type 2 diabetes mellitus with ketoacidosis without coma, without long-term current use of insulin
Submandibular abscess
Type 2 diabetes mellitus with ketoacidosis without coma, without long-term current use of insulin
Submandibular abscess

## 2021-04-16 NOTE — PROGRESS NOTE ADULT - REASON FOR ADMISSION
Facial Abscess and DKA

## 2021-04-16 NOTE — DISCHARGE NOTE PROVIDER - CARE PROVIDER_API CALL
Danielle Manrique  INTERNAL MEDICINE  [address] [place] [id number]  Phone: [phone]  Fax: [phone]  Established Patient  Follow Up Time: 2 weeks

## 2021-04-16 NOTE — DISCHARGE NOTE NURSING/CASE MANAGEMENT/SOCIAL WORK - PATIENT PORTAL LINK FT
You can access the FollowMyHealth Patient Portal offered by [facility] by registering at the following website: [email or web address]. By joining [facility]’s FollowMyHealth portal, you will also be able to view your health information using other applications (apps) compatible with our system.

## 2021-04-16 NOTE — PROGRESS NOTE ADULT - PROBLEM SELECTOR PLAN 4
Stable. Monitor for now. Consider ACEi/ARB therapy if BP/Cr allows. Stable. Monitor for now. Consider ACEi/ARB therapy if BP/Cr allows. Defer to PCP for f/u.

## 2021-04-16 NOTE — PROGRESS NOTE ADULT - SUBJECTIVE AND OBJECTIVE BOX
PROGRESS NOTE:   Authored by Nikky Garcia MD  Pager [phone]  Pager [phone]    Patient is a 46y old  Male who presents with a chief complaint of Facial Abscess and DKA (16 Apr 2021 06:00)      SUBJECTIVE / OVERNIGHT EVENTS:    MEDICATIONS  (STANDING):  acetaminophen   Tablet .. 650 milliGRAM(s) Oral every 6 hours  chlorhexidine 4% Liquid 1 Application(s) Topical <User Schedule>  dextrose 40% Gel 15 Gram(s) Oral once  dextrose 5%. 1000 milliLiter(s) (50 mL/Hr) IV Continuous <Continuous>  dextrose 5%. 1000 milliLiter(s) (100 mL/Hr) IV Continuous <Continuous>  dextrose 50% Injectable 25 Gram(s) IV Push once  dextrose 50% Injectable 12.5 Gram(s) IV Push once  dextrose 50% Injectable 25 Gram(s) IV Push once  glucagon  Injectable 1 milliGRAM(s) IntraMuscular once  insulin glargine Injectable (LANTUS) 20 Unit(s) SubCutaneous <User Schedule>  insulin lispro (ADMELOG) corrective regimen sliding scale   SubCutaneous three times a day before meals  insulin lispro (ADMELOG) corrective regimen sliding scale   SubCutaneous at bedtime  insulin lispro Injectable (ADMELOG) 8 Unit(s) SubCutaneous three times a day before meals  piperacillin/tazobactam IVPB.. 3.375 Gram(s) IV Intermittent every 8 hours  simvastatin 10 milliGRAM(s) Oral at bedtime    MEDICATIONS  (PRN):  oxyCODONE    IR 5 milliGRAM(s) Oral every 6 hours PRN Moderate Pain (4 - 6)      CAPILLARY BLOOD GLUCOSE      POCT Blood Glucose.: 130 mg/dL (15 Apr 2021 21:11)  POCT Blood Glucose.: 142 mg/dL (15 Apr 2021 17:31)  POCT Blood Glucose.: 173 mg/dL (15 Apr 2021 12:16)  POCT Blood Glucose.: 183 mg/dL (15 Apr 2021 08:41)    I&O's Summary      PHYSICAL EXAM:  Vital Signs Last 24 Hrs  T(C): 36.6 (16 Apr 2021 06:06), Max: 36.9 (15 Apr 2021 13:17)  T(F): 97.8 (16 Apr 2021 06:06), Max: 98.4 (15 Apr 2021 13:17)  HR: 71 (16 Apr 2021 06:06) (71 - 72)  BP: 123/74 (16 Apr 2021 06:06) (119/78 - 138/70)  BP(mean): --  RR: 18 (16 Apr 2021 06:06) (18 - 18)  SpO2: 98% (16 Apr 2021 06:06) (98% - 100%)    GENERAL: No acute distress, well-developed  HEAD:  Atraumatic, Normocephalic  EYES: EOMI, PERRLA, conjunctiva and sclera clear  NECK: Supple, no lymphadenopathy, no JVD  CHEST/LUNG: CTAB; No wheezes, rales, or rhonchi  HEART: Regular rate and rhythm; No murmurs, rubs, or gallops  ABDOMEN: Soft, non-tender, non-distended; normal bowel sounds, no organomegaly  EXTREMITIES:  2+ peripheral pulses b/l, No clubbing, cyanosis, or edema  NEUROLOGY: A&O x 3, no focal deficits  SKIN: No rashes or lesions    LABS:                        13.9   10.09 )-----------( 335      ( 15 Apr 2021 07:43 )             39.0     04-15    137  |  102  |  25<H>  ----------------------------<  132<H>  3.8   |  22  |  0.78    Ca    8.9      15 Apr 2021 07:43  Phos  1.9     04-15  Mg     1.8     04-15                  RADIOLOGY & ADDITIONAL TESTS:  Results Reviewed:   Imaging Personally Reviewed:  Electrocardiogram Personally Reviewed:    COORDINATION OF CARE:  Care Discussed with Consultants/Other Providers [Y/N]:  Prior or Outpatient Records Reviewed [Y/N]:   PROGRESS NOTE:   Authored by Nikky Garcia MD  Pager [phone]  Pager [phone]    Patient is a 46y old  Male who presents with a chief complaint of Facial Abscess and DKA (16 Apr 2021 06:00)      SUBJECTIVE / OVERNIGHT EVENTS: Oral drain removed this AM. Patient states that swelling and pain has improved. Tolerable during the day with tylenol and at night with oxy 5. Denies F/C, N/V.     MEDICATIONS  (STANDING):  acetaminophen   Tablet .. 650 milliGRAM(s) Oral every 6 hours  chlorhexidine 4% Liquid 1 Application(s) Topical <User Schedule>  dextrose 40% Gel 15 Gram(s) Oral once  dextrose 5%. 1000 milliLiter(s) (50 mL/Hr) IV Continuous <Continuous>  dextrose 5%. 1000 milliLiter(s) (100 mL/Hr) IV Continuous <Continuous>  dextrose 50% Injectable 25 Gram(s) IV Push once  dextrose 50% Injectable 12.5 Gram(s) IV Push once  dextrose 50% Injectable 25 Gram(s) IV Push once  glucagon  Injectable 1 milliGRAM(s) IntraMuscular once  insulin glargine Injectable (LANTUS) 20 Unit(s) SubCutaneous <User Schedule>  insulin lispro (ADMELOG) corrective regimen sliding scale   SubCutaneous three times a day before meals  insulin lispro (ADMELOG) corrective regimen sliding scale   SubCutaneous at bedtime  insulin lispro Injectable (ADMELOG) 8 Unit(s) SubCutaneous three times a day before meals  piperacillin/tazobactam IVPB.. 3.375 Gram(s) IV Intermittent every 8 hours  simvastatin 10 milliGRAM(s) Oral at bedtime    MEDICATIONS  (PRN):  oxyCODONE    IR 5 milliGRAM(s) Oral every 6 hours PRN Moderate Pain (4 - 6)      CAPILLARY BLOOD GLUCOSE      POCT Blood Glucose.: 130 mg/dL (15 Apr 2021 21:11)  POCT Blood Glucose.: 142 mg/dL (15 Apr 2021 17:31)  POCT Blood Glucose.: 173 mg/dL (15 Apr 2021 12:16)  POCT Blood Glucose.: 183 mg/dL (15 Apr 2021 08:41)    I&O's Summary      PHYSICAL EXAM:  Vital Signs Last 24 Hrs  T(C): 36.6 (16 Apr 2021 06:06), Max: 36.9 (15 Apr 2021 13:17)  T(F): 97.8 (16 Apr 2021 06:06), Max: 98.4 (15 Apr 2021 13:17)  HR: 71 (16 Apr 2021 06:06) (71 - 72)  BP: 123/74 (16 Apr 2021 06:06) (119/78 - 138/70)  BP(mean): --  RR: 18 (16 Apr 2021 06:06) (18 - 18)  SpO2: 98% (16 Apr 2021 06:06) (98% - 100%)    GENERAL: No acute distress, well-developed  HEAD:  Atraumatic, Normocephalic  EYES: EOMI, PERRLA, conjunctiva and sclera clear  NECK: Supple, no lymphadenopathy, no JVD, Extra oral submental non-erythematous resolving mild cellulitic swelling noted that is tender to touch.  ENT: drain absent, no purulent drainage or erythema from site  CHEST/LUNG: CTAB; No wheezes, rales, or rhonchi  HEART: Regular rate and rhythm; No murmurs, rubs, or gallops  ABDOMEN: Soft, non-tender, +distended; normal bowel sounds, no organomegaly  EXTREMITIES:  2+ peripheral pulses b/l, No clubbing, cyanosis, or edema  NEUROLOGY: A&O x 3, no focal deficits  SKIN: hyperpigmented chronic rash covering shins bilaterally miildly pruritic    LABS:                        13.9   10.09 )-----------( 335      ( 15 Apr 2021 07:43 )             39.0     04-15    137  |  102  |  25<H>  ----------------------------<  132<H>  3.8   |  22  |  0.78    Ca    8.9      15 Apr 2021 07:43  Phos  1.9     04-15  Mg     1.8     04-15                  RADIOLOGY & ADDITIONAL TESTS:  Results Reviewed:   Imaging Personally Reviewed:  Electrocardiogram Personally Reviewed:    COORDINATION OF CARE:  Care Discussed with Consultants/Other Providers [Y/N]:  Prior or Outpatient Records Reviewed [Y/N]:

## 2021-04-16 NOTE — PROGRESS NOTE ADULT - PROBLEM SELECTOR PLAN 1
-patient with submandibular swelling, airway not compromised on scope by ENT  -CXR unremarkable, on room air  -with noted leukocytosis +/- component of hemoconcentration due to volume contracted state  -initiated on vanc/zosyn, c/w zosyn for now  -BCx with NGTD  -appreciate dental and OMFS recs - drain removal prior to discharge, re-eval on 4/15/2021 in PM, f/u regarding abx regimen upon discharge -patient with submandibular swelling, airway not compromised on scope by ENT  -CXR unremarkable, on room air  -with noted leukocytosis +/- component of hemoconcentration due to volume contracted state  -s/p zosyn, c/w augmentin for 7 days total abx  -BCx with NGTD  -appreciate dental and OMFS recs - drain removal prior to discharge, re-eval on 4/15/2021 in PM, f/u regarding abx regimen upon discharge

## 2021-04-16 NOTE — DISCHARGE NOTE NURSING/CASE MANAGEMENT/SOCIAL WORK - CAREGIVER NAME
WORKER'S COMPENSATION RETURN TO WORK REPORT   [address] [place] [address]  [phone]    2017  EMPLOYEE INFORMATION:   EMPLOYER INFORMATION:  NAME: Carla Vazquez    [employer]  : 1960     [phone]  DATE OF INJURY/EVENT: 3/20/2017             APPOINTMENT INFORMATION:  Date: 2017.       Time out: 10:52 AM.   Location: [facility]   Treating Provider: Nabil Mejia MD    DIAGNOSIS:   1. Status post rotator cuff repair      STATUS: This injury/condition is WORK RELATED.    RETURN TO WORK:   Ms. Vazquez is unable to return to work at this time because she had surgery 3 days ago.     RESTRICTIONS:   Off work.    TREATMENT PLAN:  Medications for this injury/condition: Naproxen and hydrocodone      FOLLOW-UP VISIT: One week. 7/3/17 @ 10:45 am  Thank you for the privilege of providing medical care for this injury/condition.  If there are any questions, please call the clinic at Dept: [phone].      Electronically signed on 2017 at 10:52 AM by:   Nabil Mejia MD    Kaitlyn Saleh no

## 2021-04-16 NOTE — PROGRESS NOTE ADULT - PROBLEM SELECTOR PLAN 3
-likely pre-renal given severe fluid depletion in setting of DKA  -greatly improved after fluid resuscitation -likely pre-renal given severe fluid depletion in setting of DKA - now resolved.

## 2021-04-16 NOTE — PROGRESS NOTE ADULT - ASSESSMENT
46 year old male with DKA s/p exo and I&D #29 with Penrose drain placement on 4/12/2021 - progressing well    - care as per primary  - appreciate endocrin reccs  - drain removal prior to discharge on 4/16/2021  - pain control as per primary, patient request non-drowsy pain control for discharge which will allow him to continue to work 46 year old male with DKA s/p exo and I&D #29 with Penrose drain placement on 4/12/2021 - progressing well    - care as per primary  - appreciate endocrin reccs  - drain removed,  patient cleared for discharge from OMFS standpoint  - warm compress and massage to Right submand gland  - pain control as per primary, patient request non-drowsy pain control for discharge which will allow him to continue to work

## 2021-04-16 NOTE — DISCHARGE NOTE PROVIDER - HOSPITAL COURSE
46 y.o M with PMH of DM (not on insulin at home), CHALINO presents to the hospital after jaw swelling since 4/7 after root canal. On 4/5 patient had root canal performed and developed swelling for which he received clindamycin as well as ibuprofen for pain control. During this time the patient endorses decreasing po intake due to pain, as well as persistent polyuria/polydipsia and nausea (denies vomitting). The patient was then re-evaluated by oral surgery on 4/12 AM and was told to go to the ED for further evaluation.     In the ED the patient was found to have DKA with a pH of 7.21 as well as an anion gap of 33. The patient was given a IVF bolus as well as started on an insulin drip. The patient was never in the ICU for his diabetes, and denies any prior episodes of DKA. Patient takes metformin 500ER BID at home as well as glyxambi (10-5) daily and has been managed by his PCP Dr. Johnson.     On CT the patient was also found to have a developing  phlegmon/abscess in the right platysma and was evaluated by ENT/dental/OMFS. Patient was started on zosyn on 4/12. ENT scoped the patient and noted no edema of the vocal cords. On 4/12 MN drain in place. Drain was removed on 4/16.    For DM, patient was transitioned to basal bolus with adequate glucose control (lantus 20 units, admelog 8 units premeal). Upon discharge patient was transitioned to his oral DM medications with follow up.    46 y.o M with PMH of DM (not on insulin at home), CHALINO presents to the hospital after jaw swelling since 4/7 after root canal. On 4/5 patient had root canal performed and developed swelling for which he received clindamycin as well as ibuprofen for pain control. During this time the patient endorses decreasing po intake due to pain, as well as persistent polyuria/polydipsia and nausea (denies vomitting). The patient was then re-evaluated by oral surgery on 4/12 AM and was told to go to the ED for further evaluation.     In the ED the patient was found to have DKA with a pH of 7.21 as well as an anion gap of 33. The patient was given a IVF bolus as well as started on an insulin drip. The patient was never in the ICU for his diabetes, and denies any prior episodes of DKA. Patient takes metformin 500ER BID at home as well as glyxambi (10-5) daily and has been managed by his PCP Dr. Johnson.     On CT the patient was also found to have a developing  phlegmon/abscess in the right platysma and was evaluated by ENT/dental/OMFS. Patient was started on zosyn on 4/12. ENT scoped the patient and noted no edema of the vocal cords. On 4/12 VT drain in place. Drain was removed on 4/16.    For DM, patient was transitioned to basal bolus with adequate glucose control (lantus 20 units, admelog 8 units premeal). Upon discharge patient was transitioned to PO DM medications with increase metformin to 1000mg BID and discontinuation of glyxambi and switch to semaglutide (oral GLP-1 agonist) 3 mg PO daily with follow up.    46 y.o M with PMH of DM (not on insulin at home), CHALINO presents to the hospital after jaw swelling since 4/7 after root canal. On 4/5 patient had root canal performed and developed swelling for which he received clindamycin as well as ibuprofen for pain control. During this time the patient endorses decreasing po intake due to pain, as well as persistent polyuria/polydipsia and nausea (denies vomitting). The patient was then re-evaluated by oral surgery on 4/12 AM and was told to go to the ED for further evaluation.     In the ED the patient was found to have DKA with a pH of 7.21 as well as an anion gap of 33. The patient was given a IVF bolus as well as started on an insulin drip. The patient was never in the ICU for his diabetes, and denies any prior episodes of DKA. Patient takes metformin 500ER BID at home as well as glyxambi (10-5) daily and has been managed by his PCP Dr. Johnson.     On CT the patient was also found to have a developing  phlegmon/abscess in the right platysma and was evaluated by ENT/dental/OMFS. Patient was started on zosyn on 4/12. ENT scoped the patient and noted no edema of the vocal cords. On 4/12 AK drain in place. Drain was removed on 4/16. As discussed w/ OMFS, pt will be transitioned to Augmentin. He will follow up as OP.    For DM, patient was transitioned to basal bolus with adequate glucose control (lantus 20 units, admelog 8 units premeal). Upon discharge patient was transitioned to PO DM medications with increase metformin to 1000mg BID and discontinuation of glyxambi and switch to semaglutide (oral GLP-1 agonist) 3 mg PO daily with follow up.

## 2021-04-16 NOTE — PROGRESS NOTE ADULT - NSICDXPILOT_GEN_ALL_CORE
[place]

## 2021-04-16 NOTE — DISCHARGE NOTE PROVIDER - NSDCCPCAREPLAN_GEN_ALL_CORE_FT
PRINCIPAL DISCHARGE DIAGNOSIS  Diagnosis: Submandibular abscess  Assessment and Plan of Treatment: You were found to have an infection below your  lower jaw. You were seen by ENT, dental, and OMFS while inpatient. You were started on IV antibiotics and a drain was placed to drain any infection not cleared by the antibiotics. Your drain has been removed and you have been transitioned to oral antibiotics. Please take your antibiotics as prescribed. Please follow up with your oral surgeon or our OMFS team to ensure clearance of the infection.      SECONDARY DISCHARGE DIAGNOSES  Diagnosis: DKA (diabetic ketoacidoses)  Assessment and Plan of Treatment: You were found to be in diabetic ketoacidosis due to inadequate blood sugar control. You were admitted to the ICU for IV insulin. You were transitioned to insulin before meals and at bedtime. You blood sugars have been well controlled, and you can now transition back to your home diabetes medication regimen. Please follow up with your PCP within 2 weeks of discharge to assure adequate control. Please continue to measure your fingersticks at home.

## 2021-04-16 NOTE — PROGRESS NOTE ADULT - ATTENDING COMMENTS
46 year old man with history of diabetes in the MICU for management of DKA that occurred secondary to a dental abscess post root cananl    - DKA resolved  - will transition to basal bolus  - advande diet as tolerated  - monitor 'lytes and replete as needed  - appreciate OMFS input   - continue ABx  - follow up Endocrine recommendation
46 year old man with history of diabetes in the MICU for management of DKA that occurred secondary to a dental abscess post root cananl    - continue ivf and insulin drip  - monitor 'lytes and replete as needed  - s/p drain placement by OMFS for abscess  - continue ABx  - Endocrine evaluation
46M with PMH of DM2 who presents to the hospital with jaw swelling further complicated by DKA. OMFS/endo on board. Pt was admitted to the ICU for further management. Gap closed w/ insulin drip. Drain placed by OMFS. Pt started on IV abx. W/ improvement, he was transferred to medicine. Pt seen at bedside. Continues to report some jaw discomfort, although improved. Tolerating PO intake. Labs otherwise stable.    -Cont IV Zosyn. Will reassess dispo regimen.   -Drain to come out later today.  -OMFS/endo f/u.  -Consider low dose ACEi if BP/Cr allows.   -Dispo planning.
46M with PMH of DM2 who presents to the hospital with jaw swelling further complicated by DKA. OMFS/endo on board. Pt was admitted to the ICU for further management. Gap closed w/ insulin drip. Drain placed by OMFS. Pt started on IV abx. W/ improvement, he was transferred to medicine. Drain removed yesterday. Pt seen at bedside. No complaints. Swelling persist, but improved. Less tender. Tolerating PO. reports good pain control w/ tylenol.     -Appreciate OMFS f/u. Augmentin on DC.  -Reviewed medication usage, particularly side effects of opiates. Pt plan on return to work on Tues. I advised him to avoid operation of heavy machinery/driving/tools/electrical work when using opiates. Fortunately, pain requirements here have been low. Advise close follow up with dental/OMFS. 35 minutes spent preparing DC, counseling pt, and coordination of care.

## 2021-04-16 NOTE — PROGRESS NOTE ADULT - SUBJECTIVE AND OBJECTIVE BOX
Patient is a 46y Male with PMH significant for DM, had root canal on tooth #29 on 4/7, now p/w with worsening swelling below jaw. red, warm. has odynophagia but no dysphagia. no sob. no fevers or chills. has been on clinda x5d. Patient is s/p exo #29 in dental clinic on 04/12/2021. Transfer from MICU to floor 4/15/2021, now resting comfortably.    PAST MEDICAL & SURGICAL HISTORY:  DM (diabetes mellitus)  Atopic dermatitis    MEDICATIONS  (STANDING):  acetaminophen   Tablet .. 650 milliGRAM(s) Oral every 6 hours  chlorhexidine 4% Liquid 1 Application(s) Topical <User Schedule>  dextrose 40% Gel 15 Gram(s) Oral once  dextrose 5%. 1000 milliLiter(s) (50 mL/Hr) IV Continuous <Continuous>  dextrose 5%. 1000 milliLiter(s) (100 mL/Hr) IV Continuous <Continuous>  dextrose 50% Injectable 25 Gram(s) IV Push once  dextrose 50% Injectable 12.5 Gram(s) IV Push once  dextrose 50% Injectable 25 Gram(s) IV Push once  glucagon  Injectable 1 milliGRAM(s) IntraMuscular once  heparin   Injectable 5000 Unit(s) SubCutaneous every 8 hours  insulin glargine Injectable (LANTUS) 20 Unit(s) SubCutaneous <User Schedule>  insulin lispro (ADMELOG) corrective regimen sliding scale   SubCutaneous three times a day before meals  insulin lispro (ADMELOG) corrective regimen sliding scale   SubCutaneous at bedtime  insulin lispro Injectable (ADMELOG) 8 Unit(s) SubCutaneous three times a day before meals  piperacillin/tazobactam IVPB.. 3.375 Gram(s) IV Intermittent every 8 hours  simvastatin 10 milliGRAM(s) Oral at bedtime    MEDICATIONS  (PRN):  oxyCODONE    IR 5 milliGRAM(s) Oral every 6 hours PRN Moderate Pain (4 - 6)    Vital Signs Last 24 Hrs  T(C): 36.7 (15 Apr 2021 21:07), Max: 36.9 (15 Apr 2021 13:17)  T(F): 98.1 (15 Apr 2021 21:07), Max: 98.4 (15 Apr 2021 13:17)  HR: 71 (15 Apr 2021 21:07) (71 - 72)  BP: 138/70 (15 Apr 2021 21:07) (119/78 - 138/70)  BP(mean): --  RR: 18 (15 Apr 2021 21:07) (18 - 18)  SpO2: 100% (15 Apr 2021 21:07) (99% - 100%)      LABS:              13.9   10.09 )-----------( 335      ( 15 Apr 2021 07:43 )             39.0     04-15    137  |  102  |  25<H>  ----------------------------<  132<H>  3.8   |  22  |  0.78    Ca    8.9      15 Apr 2021 07:43  Phos  1.9     04-15  Mg     1.8     04-15      14 Apr 2021 07:01  -  15 Apr 2021 07:00  --------------------------------------------------------  IN:    dextrose 5% + lactated ringers w/ Additives: 1200 mL    Insulin: 5.5 mL    Insulin: 25 mL    Oral Fluid: 200 mL  Total IN: 1430.5 mL  OUT:    Voided (mL): 1800 mL  Total OUT: 1800 mL  Total NET: -369.5 mL      I&O's Summary    14 Apr 2021 07:01  -  15 Apr 2021 07:00  --------------------------------------------------------  IN: 1430.5 mL / OUT: 1800 mL / NET: -369.5 mL      CAPILLARY BLOOD GLUCOSE    POCT Blood Glucose.: 130 mg/dL (15 Apr 2021 21:11)  POCT Blood Glucose.: 142 mg/dL (15 Apr 2021 17:31)  POCT Blood Glucose.: 173 mg/dL (15 Apr 2021 12:16)  POCT Blood Glucose.: 183 mg/dL (15 Apr 2021 08:41)      PE  Gen; AAOx3, NC/ND, atraumatic  EOE: Extra oral submental non-erythematous resolving mild cellulitic swelling noted that is tender to touch.  IOE:  Penrose drain x1 in extration site #29. no purluent drainage, granulation tissue and fibrin clotting present. site irrigated with sterile saline.     Procedure: removal of intra-oral Penrose drain x1. silk suture cut with iris sicssors and drain removed with patient verbal consent.     Patient is a 46y Male with PMH significant for DM, had root canal on tooth #29 on 4/7, now p/w with worsening swelling below jaw. red, warm. has odynophagia but no dysphagia. no sob. no fevers or chills. has been on clinda x5d. Patient is s/p exo #29 in dental clinic on 04/12/2021. Transfer from MICU to floor 4/15/2021, now resting comfortably.    PAST MEDICAL & SURGICAL HISTORY:  DM (diabetes mellitus)  Atopic dermatitis    MEDICATIONS  (STANDING):  acetaminophen   Tablet .. 650 milliGRAM(s) Oral every 6 hours  chlorhexidine 4% Liquid 1 Application(s) Topical <User Schedule>  dextrose 40% Gel 15 Gram(s) Oral once  dextrose 5%. 1000 milliLiter(s) (50 mL/Hr) IV Continuous <Continuous>  dextrose 5%. 1000 milliLiter(s) (100 mL/Hr) IV Continuous <Continuous>  dextrose 50% Injectable 25 Gram(s) IV Push once  dextrose 50% Injectable 12.5 Gram(s) IV Push once  dextrose 50% Injectable 25 Gram(s) IV Push once  glucagon  Injectable 1 milliGRAM(s) IntraMuscular once  heparin   Injectable 5000 Unit(s) SubCutaneous every 8 hours  insulin glargine Injectable (LANTUS) 20 Unit(s) SubCutaneous <User Schedule>  insulin lispro (ADMELOG) corrective regimen sliding scale   SubCutaneous three times a day before meals  insulin lispro (ADMELOG) corrective regimen sliding scale   SubCutaneous at bedtime  insulin lispro Injectable (ADMELOG) 8 Unit(s) SubCutaneous three times a day before meals  piperacillin/tazobactam IVPB.. 3.375 Gram(s) IV Intermittent every 8 hours  simvastatin 10 milliGRAM(s) Oral at bedtime    MEDICATIONS  (PRN):  oxyCODONE    IR 5 milliGRAM(s) Oral every 6 hours PRN Moderate Pain (4 - 6)    Vital Signs Last 24 Hrs  T(C): 36.7 (15 Apr 2021 21:07), Max: 36.9 (15 Apr 2021 13:17)  T(F): 98.1 (15 Apr 2021 21:07), Max: 98.4 (15 Apr 2021 13:17)  HR: 71 (15 Apr 2021 21:07) (71 - 72)  BP: 138/70 (15 Apr 2021 21:07) (119/78 - 138/70)  BP(mean): --  RR: 18 (15 Apr 2021 21:07) (18 - 18)  SpO2: 100% (15 Apr 2021 21:07) (99% - 100%)      LABS:              13.9   10.09 )-----------( 335      ( 15 Apr 2021 07:43 )             39.0     04-15    137  |  102  |  25<H>  ----------------------------<  132<H>  3.8   |  22  |  0.78    Ca    8.9      15 Apr 2021 07:43  Phos  1.9     04-15  Mg     1.8     04-15      14 Apr 2021 07:01  -  15 Apr 2021 07:00  --------------------------------------------------------  IN:    dextrose 5% + lactated ringers w/ Additives: 1200 mL    Insulin: 5.5 mL    Insulin: 25 mL    Oral Fluid: 200 mL  Total IN: 1430.5 mL  OUT:    Voided (mL): 1800 mL  Total OUT: 1800 mL  Total NET: -369.5 mL      I&O's Summary    14 Apr 2021 07:01  -  15 Apr 2021 07:00  --------------------------------------------------------  IN: 1430.5 mL / OUT: 1800 mL / NET: -369.5 mL      CAPILLARY BLOOD GLUCOSE    POCT Blood Glucose.: 130 mg/dL (15 Apr 2021 21:11)  POCT Blood Glucose.: 142 mg/dL (15 Apr 2021 17:31)  POCT Blood Glucose.: 173 mg/dL (15 Apr 2021 12:16)  POCT Blood Glucose.: 183 mg/dL (15 Apr 2021 08:41)      PE  Gen; AAOx3, NC/ND, atraumatic  EOE: Extra oral submental improving  resolving erythematous resolving mild cellulitic swelling noted that is tender to touch.  IOE:  Penrose drain x1 in extration site #29. no purluent drainage, granulation tissue and fibrin clotting present. site irrigated with sterile saline.     Procedure: removal of intra-oral Penrose drain x1. silk suture cut with iris sicssors and drain removed with patient verbal consent.

## 2021-04-16 NOTE — DISCHARGE NOTE PROVIDER - NSFOLLOWUPCLINICS_GEN_ALL_ED_FT
Oral & Maxillofacial Surgery  Department of Dental Medicine  [address] [place] [address]  Phone: [phone]  Fax: [phone]  Follow Up Time: 1 week     Oral & Maxillofacial Surgery  Department of Dental Medicine  [address] [place] [address]  Phone: [phone]  Fax: [phone]  Follow Up Time: 1 week    [facility] Endocrinology  Endocrinology  [address] [place] [id number]  Phone: [phone]  Fax:

## 2021-04-16 NOTE — DISCHARGE NOTE PROVIDER - NSDCMRMEDTOKEN_GEN_ALL_CORE_FT
Augmentin 875 mg-125 mg oral tablet: 1 tab(s) orally 2 times a day   Glyxambi 10 mg-5 mg oral tablet: 1 tab(s) orally once a day (in the morning)  metFORMIN 500 mg oral tablet, extended release: 1 tab(s) orally 2 times a day  semaglutide 3 mg oral tablet: 1 tab(s) orally once a day   simvastatin 10 mg oral tablet: 1 tab(s) orally once a day (at bedtime)   Actamin 325 mg oral tablet: 2 tab(s) orally every 6 hours, As Needed  Augmentin 875 mg-125 mg oral tablet: 1 tab(s) orally 2 times a day   metFORMIN 1000 mg oral tablet, extended release: 1 tab(s) orally 2 times a day  oxyCODONE 5 mg oral tablet: 1 tab(s) orally every 6 hours, As needed, Moderate Pain (4 - 6)  semaglutide 3 mg oral tablet: 1 tab(s) orally once a day   simvastatin 10 mg oral tablet: 1 tab(s) orally once a day (at bedtime)   Actamin 325 mg oral tablet: 2 tab(s) orally every 6 hours, As Needed  Augmentin 875 mg-125 mg oral tablet: 1 tab(s) orally 2 times a day   metFORMIN 1000 mg oral tablet, extended release: 1 tab(s) orally 2 times a day  oxyCODONE 5 mg oral tablet: 1 tab(s) orally every 6 hours, As needed, Moderate Pain (4 - 6)  oxyCODONE 5 mg oral tablet: 1 tab(s) orally every 6 hours, As needed, Moderate Pain (4 - 6) MDD:20m g  semaglutide 3 mg oral tablet: 1 tab(s) orally once a day   simvastatin 10 mg oral tablet: 1 tab(s) orally once a day (at bedtime)   Actamin 325 mg oral tablet: 2 tab(s) orally every 6 hours, As Needed  Augmentin 875 mg-125 mg oral tablet: 1 tab(s) orally 2 times a day   metFORMIN 1000 mg oral tablet: 1 tab(s) orally 2 times a day   oxyCODONE 5 mg oral tablet: 1 tab(s) orally every 6 hours, As needed, Moderate Pain (4 - 6) MDD:20m g  oxyCODONE 5 mg oral tablet: 1 tab(s) orally every 6 hours, As needed, Moderate Pain (4 - 6)  semaglutide 3 mg oral tablet: 1 tab(s) orally once a day   simvastatin 10 mg oral tablet: 1 tab(s) orally once a day (at bedtime)   Actamin 325 mg oral tablet: 2 tab(s) orally every 6 hours, As Needed  Augmentin 875 mg-125 mg oral tablet: 1 tab(s) orally 2 times a day   metFORMIN 1000 mg oral tablet: 1 tab(s) orally 2 times a day   oxyCODONE 5 mg oral tablet: 1 tab(s) orally every 6 hours, As needed, Moderate Pain (4 - 6) MDD:20m g  oxyCODONE 5 mg oral tablet: 1 tab(s) orally every 6 hours, As needed, Moderate Pain (4 - 6) MDD:4 doses  semaglutide 3 mg oral tablet: 1 tab(s) orally once a day   simvastatin 10 mg oral tablet: 1 tab(s) orally once a day (at bedtime)

## 2021-04-17 LAB
CULTURE RESULTS: SIGNIFICANT CHANGE UP
SPECIMEN SOURCE: SIGNIFICANT CHANGE UP

## 2021-10-18 NOTE — PROGRESS NOTE ADULT - PROBLEM SELECTOR PLAN 6
Patient/Caregiver provided printed discharge information. DVT ppx: hep subq  Diet: CC  Disposition: pending FS control  Code Status: DVT ppx: hep subq  Diet: CC  Disposition: home   Code Status:

## 2024-08-04 ENCOUNTER — NON-APPOINTMENT (OUTPATIENT)
Age: 49
End: 2024-08-04

## 2024-08-09 NOTE — ED PROVIDER NOTE - NS ED MD DISPO DISCHARGE CCDA
The patient is a 25y Female complaining of abdominal pain. Patient/Caregiver provided printed discharge information.

## 2024-10-23 ENCOUNTER — NON-APPOINTMENT (OUTPATIENT)
Age: 49
End: 2024-10-23

## 2024-11-01 ENCOUNTER — EMERGENCY (EMERGENCY)
Facility: HOSPITAL | Age: 49
LOS: 1 days | Discharge: HOME | End: 2024-11-01
Attending: EMERGENCY MEDICINE | Admitting: EMERGENCY MEDICINE
Payer: COMMERCIAL

## 2024-11-01 VITALS
TEMPERATURE: 98 F | OXYGEN SATURATION: 98 % | DIASTOLIC BLOOD PRESSURE: 81 MMHG | SYSTOLIC BLOOD PRESSURE: 162 MMHG | HEART RATE: 91 BPM | HEIGHT: 66 IN | WEIGHT: 199.96 LBS | RESPIRATION RATE: 18 BRPM

## 2024-11-01 VITALS
HEART RATE: 76 BPM | SYSTOLIC BLOOD PRESSURE: 141 MMHG | DIASTOLIC BLOOD PRESSURE: 73 MMHG | RESPIRATION RATE: 18 BRPM | TEMPERATURE: 98 F | OXYGEN SATURATION: 100 %

## 2024-11-01 LAB
ALBUMIN SERPL ELPH-MCNC: 4.3 G/DL — SIGNIFICANT CHANGE UP (ref 3.3–5)
ALP SERPL-CCNC: 79 U/L — SIGNIFICANT CHANGE UP (ref 40–120)
ALT FLD-CCNC: 18 U/L — SIGNIFICANT CHANGE UP (ref 4–41)
ANION GAP SERPL CALC-SCNC: 13 MMOL/L — SIGNIFICANT CHANGE UP (ref 7–14)
AST SERPL-CCNC: 19 U/L — SIGNIFICANT CHANGE UP (ref 4–40)
BASOPHILS # BLD AUTO: 0.14 K/UL — SIGNIFICANT CHANGE UP (ref 0–0.2)
BASOPHILS NFR BLD AUTO: 1.4 % — SIGNIFICANT CHANGE UP (ref 0–2)
BILIRUB SERPL-MCNC: 0.4 MG/DL — SIGNIFICANT CHANGE UP (ref 0.2–1.2)
BUN SERPL-MCNC: 22 MG/DL — SIGNIFICANT CHANGE UP (ref 7–23)
CALCIUM SERPL-MCNC: 9.4 MG/DL — SIGNIFICANT CHANGE UP (ref 8.4–10.5)
CHLORIDE SERPL-SCNC: 102 MMOL/L — SIGNIFICANT CHANGE UP (ref 98–107)
CO2 SERPL-SCNC: 21 MMOL/L — LOW (ref 22–31)
CREAT SERPL-MCNC: 0.82 MG/DL — SIGNIFICANT CHANGE UP (ref 0.5–1.3)
EGFR: 107 ML/MIN/1.73M2 — SIGNIFICANT CHANGE UP
EOSINOPHIL # BLD AUTO: 0.96 K/UL — HIGH (ref 0–0.5)
EOSINOPHIL NFR BLD AUTO: 9.5 % — HIGH (ref 0–6)
GLUCOSE SERPL-MCNC: 132 MG/DL — HIGH (ref 70–99)
HCT VFR BLD CALC: 44.8 % — SIGNIFICANT CHANGE UP (ref 39–50)
HGB BLD-MCNC: 16.4 G/DL — SIGNIFICANT CHANGE UP (ref 13–17)
IANC: 6.22 K/UL — SIGNIFICANT CHANGE UP (ref 1.8–7.4)
IMM GRANULOCYTES NFR BLD AUTO: 0.3 % — SIGNIFICANT CHANGE UP (ref 0–0.9)
LYMPHOCYTES # BLD AUTO: 1.79 K/UL — SIGNIFICANT CHANGE UP (ref 1–3.3)
LYMPHOCYTES # BLD AUTO: 17.7 % — SIGNIFICANT CHANGE UP (ref 13–44)
MCHC RBC-ENTMCNC: 34.7 PG — HIGH (ref 27–34)
MCHC RBC-ENTMCNC: 36.6 G/DL — HIGH (ref 32–36)
MCV RBC AUTO: 94.9 FL — SIGNIFICANT CHANGE UP (ref 80–100)
MONOCYTES # BLD AUTO: 0.95 K/UL — HIGH (ref 0–0.9)
MONOCYTES NFR BLD AUTO: 9.4 % — SIGNIFICANT CHANGE UP (ref 2–14)
NEUTROPHILS # BLD AUTO: 6.22 K/UL — SIGNIFICANT CHANGE UP (ref 1.8–7.4)
NEUTROPHILS NFR BLD AUTO: 61.7 % — SIGNIFICANT CHANGE UP (ref 43–77)
NRBC # BLD: 0 /100 WBCS — SIGNIFICANT CHANGE UP (ref 0–0)
NRBC # FLD: 0 K/UL — SIGNIFICANT CHANGE UP (ref 0–0)
PLATELET # BLD AUTO: 199 K/UL — SIGNIFICANT CHANGE UP (ref 150–400)
POTASSIUM SERPL-MCNC: 4.3 MMOL/L — SIGNIFICANT CHANGE UP (ref 3.5–5.3)
POTASSIUM SERPL-SCNC: 4.3 MMOL/L — SIGNIFICANT CHANGE UP (ref 3.5–5.3)
PROT SERPL-MCNC: 7.4 G/DL — SIGNIFICANT CHANGE UP (ref 6–8.3)
RBC # BLD: 4.72 M/UL — SIGNIFICANT CHANGE UP (ref 4.2–5.8)
RBC # FLD: 11.8 % — SIGNIFICANT CHANGE UP (ref 10.3–14.5)
SODIUM SERPL-SCNC: 136 MMOL/L — SIGNIFICANT CHANGE UP (ref 135–145)
WBC # BLD: 10.09 K/UL — SIGNIFICANT CHANGE UP (ref 3.8–10.5)
WBC # FLD AUTO: 10.09 K/UL — SIGNIFICANT CHANGE UP (ref 3.8–10.5)

## 2024-11-01 PROCEDURE — 99285 EMERGENCY DEPT VISIT HI MDM: CPT

## 2024-11-01 PROCEDURE — 73140 X-RAY EXAM OF FINGER(S): CPT | Mod: 26,RT

## 2024-11-01 RX ORDER — ACETAMINOPHEN 500 MG
650 TABLET ORAL ONCE
Refills: 0 | Status: COMPLETED | OUTPATIENT
Start: 2024-11-01 | End: 2024-11-01

## 2024-11-01 RX ORDER — AMPICILLIN AND SULBACTAM 2; 1 G/1; G/1
3 INJECTION, POWDER, FOR SOLUTION INTRAMUSCULAR; INTRAVENOUS ONCE
Refills: 0 | Status: COMPLETED | OUTPATIENT
Start: 2024-11-01 | End: 2024-11-01

## 2024-11-01 RX ORDER — IBUPROFEN 200 MG
1 TABLET ORAL
Qty: 20 | Refills: 0
Start: 2024-11-01

## 2024-11-01 RX ORDER — OXYCODONE HYDROCHLORIDE 30 MG/1
1 TABLET ORAL
Qty: 5 | Refills: 0
Start: 2024-11-01

## 2024-11-01 RX ORDER — IBUPROFEN 200 MG
600 TABLET ORAL ONCE
Refills: 0 | Status: COMPLETED | OUTPATIENT
Start: 2024-11-01 | End: 2024-11-01

## 2024-11-01 RX ADMIN — Medication 600 MILLIGRAM(S): at 14:37

## 2024-11-01 RX ADMIN — AMPICILLIN AND SULBACTAM 200 GRAM(S): 2; 1 INJECTION, POWDER, FOR SOLUTION INTRAMUSCULAR; INTRAVENOUS at 13:44

## 2024-11-01 RX ADMIN — Medication 650 MILLIGRAM(S): at 13:09

## 2024-11-01 RX ADMIN — Medication 650 MILLIGRAM(S): at 14:36

## 2024-11-01 RX ADMIN — Medication 600 MILLIGRAM(S): at 13:09

## 2024-11-01 NOTE — ED ADULT TRIAGE NOTE - CHIEF COMPLAINT QUOTE
c/o worsening right hand index finger nail bed discoloration was told by urgent care that it is an infection, states could not follow up with hand specialty because they don't accept pts with active infection. Phx fo DM type 2, high cholesterol.

## 2024-11-01 NOTE — ED PROVIDER NOTE - LOCATION
right hand: able to make a fist, slight limited ROM of 2nd finger due to mild swelling,  dark discoloration at lateral tip of  2nd finger, dark discoloration under nailbed of 2nd finger, diffuse mild tenderness over cuticle of 2nd finger , no erythema, no crepitus, no fluctuance, no discharge, no bleeding, skin intact, dry, sensation intact, no deformity, no joint tenderness, able to flex and extend at DIP joint, PIP joint and MCP joint.

## 2024-11-01 NOTE — ED PROVIDER NOTE - CLINICAL SUMMARY MEDICAL DECISION MAKING FREE TEXT BOX
50 yo M with PMH of IDDM T2, presents to ED c/o worsening ight finger pain, swelling and discoloration x2 wks, atraumatic. well appearing male. there is no fever. s/p antibiotic Cephalexin 500 mg TID x1wk and Clindamycin 300 mg TID x1wk. Exam not consistent with abscess. not consistent w/ paronychia. Plan: Xray R finger to r/o acute pathology, pain control w/ Tylenol and Ibuprofen, reassessment for additional antibiotic coverage, likely d/c with hand specialist follow up.

## 2024-11-01 NOTE — ED ADULT TRIAGE NOTE - NS ED TRIAGE AVPU SCALE
Alert-The patient is alert, awake and responds to voice. The patient is oriented to time, place, and person. The triage nurse is able to obtain subjective information. Glycopyrrolate Counseling:  I discussed with the patient the risks of glycopyrrolate including but not limited to skin rash, drowsiness, dry mouth, difficulty urinating, and blurred vision.

## 2024-11-01 NOTE — ED PROVIDER NOTE - CARE PROVIDER_API CALL
Rj Miller  Surgery of the Hand  [address] [address] [place] [id number]  Phone: [phone]  Fax: [phone]  Follow Up Time: Urgent

## 2024-11-01 NOTE — ED PROVIDER NOTE - PROGRESS NOTE DETAILS
ALFONSO BIGGS: Patient reassessed, sitting comfortably in chair in NAD, denies any complaints. States feeling better, symptoms improved. pt seen by hand surgery, low concern for infection, no antibiotic at this time, outpatient follow up with Dr. Miller. Pt is medically stable for discharge and follow up with PMD and Hand. The patient was given verbal and written discharge instructions. Specifically, instructions when to return to the ED and when to seek follow-up from their pcp was discussed. Any specialty follow-up was discussed, including how to make an appointment.  Instructions were discussed in simple, plain language and was understood by the patient. The patient understands that should their symptoms worsen or any new symptoms arise, they should return to the ED immediately for further evaluation. All pt's questions were answered. Patient verbalizes understanding.

## 2024-11-01 NOTE — ED PROVIDER NOTE - CARDIOVASCULAR NEGATIVE STATEMENT, MLM
Finish your current package of birth control pills, then begin your new, progesterone only birth control pill.  Call my nurses at [phone] if you have any questions about the medication.    Someone should be contacting you within the next 2 weeks to schedule a screening colonoscopy.    Do not forget to schedule your mammogram!    You should receive the results of any lab work ordered or performed today within 2 weeks of its completion.  If you do not receive notification of the results, contact our telephone nurses at [phone].   no chest pain and no edema.

## 2024-11-01 NOTE — ED PROVIDER NOTE - NSFOLLOWUPINSTRUCTIONS_ED_ALL_ED_FT
Rest, drink plenty of fluids.  Advance activity as tolerated.  Continue all previously prescribed medications as directed.  Follow up with your primary care physician in 48-72 hours- bring copies of your results.  Return to the ER for worsening or persistent symptoms, and/or ANY NEW OR CONCERNING SYMPTOMS. If you have issues obtaining follow up, please call: [phone] to obtain a doctor or specialist who takes your insurance in your area.    Take Motrin/Ibuprofen 600 mg every 6-8 hours as needed for moderate pain -- take with food.   Take Oxycodone 1 tablet every 8 hrs as needed for breakthrough discomfort- caution drowsiness while taking this medication- do not drive or operate heavy machinery.     -Band aid over bruised nail  -Recommend avoiding cleaning under the nails with sharp objects    Follow up with Dr. Rj Miller, hand  surgery.

## 2024-11-01 NOTE — CONSULT NOTE ADULT - ATTENDING COMMENTS
49yMale presenting to ED with 2 weeks of R IF discoloration and pain under nail tip not improved with po abx. Likely ecchymosis under nail tip without concern for acute infection.    - Dress R IF with band aid over bruised nail  - Recommend avoiding cleaning under the nails with sharp objects  - Low concern for acute infection  - Pain control as needed

## 2024-11-01 NOTE — ED PROVIDER NOTE - OBJECTIVE STATEMENT
50 yo M with PMH of IDDM T2, presents to ED c/o right finger pain, swelling and discoloration x2 wks, atraumatic. Reports works a lot of his hands, always scrubbing his nails, then noted dark spot at tip of finger, gradually got worse w/ swelling, throbbing, pain, dark discoloration of tip of finger and under the nail. States he was seen at urgent care 2 wks ago, prescribed Cephalexin 500 mg x1wk and Clindamycin 300 mg every 8hrs x1wk. Admits finished both antibiotic w/ no improvement. Admits pain 4/10. Admits numbness of finger tip. Denies any fever, chills, discharge, weakness, injuries, or any other complaints. 48 yo M with PMH of IDDM T2, presents to ED c/o right finger pain, swelling and discoloration x2 wks, atraumatic. 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 States he was seen at urgent care 2 wks ago, prescribed Cephalexin 500 mg x1wk and Clindamycin 300 mg every 8hrs x1wk. Admits finished both antibiotic w/ no improvement. Admits pain 4/10. Admits numbness of finger tip. Reports he was referred to see hand specialist, but told he can't be seen due to active infection, thus come to ED for evaluation. Denies any fever, chills, discharge, weakness, injuries, or any other complaints.

## 2024-11-01 NOTE — ED PROVIDER NOTE - ATTENDING APP SHARED VISIT CONTRIBUTION OF CARE
Form placed in Providers in-box to be completed.     Pt was seen and evaluated by me. Pt is a 48 y/o male with PMHx of DM type 2 who presented to the ED for right 2nd finger pain and discoloration X 2 wks. Pt works doing heating and cooling and notes having a dark spot under the nail of his right 2nd finger. Pt notes it got larger with swelling and pain. Pt notes he went to [facility] and was started on Keflex and clinda with minimal improvement. Pt was referred to Hand Surgeon but they were unable to see him citing active infection. Pt denies any fever, chills, nausea, vomiting, SOB, chest pain, or abd pain.  VITALS: Vitals have been reviewed.  GEN APPEARANCE: Alert and cooperative, non-toxic appearing and in NAD  HEAD: Atraumatic, normocephalic. .   NOSE: No nasal discharge.   THROAT: MMM. Oral cavity and pharynx normal.   MSK/EXT: Right second finger with blackish discoloration to under right 2nd finger with some swelling to finger.   NEURO: Alert, follows commands. Speech normal. Sensation and motor normal x4 extremities.   SKIN: Blackish discoloration to under right 2nd finger   48 y/o male with PMHx of DM type 2 who presented to the ED for right 2nd finger pain and discoloration X 2 wks.   Concern for Finger infection, vascular abnormality  Will get labs and X-ray looking for infection. Will give antibiotics and consult hand

## 2024-11-01 NOTE — CONSULT NOTE ADULT - ASSESSMENT
ASSESSMENT/PLAN:   RAUL ALLEN is a 49yMale presenting to ED with 2 weeks of R IF discoloration and pain under nail tip not improved with po abx. Likely ecchymosis under nail tip without concern for acute infection.    - Dress R IF with band aid over bruised nail  - Recommend avoiding cleaning under the nails with sharp objects  - Low concern for acute infection  - Pain control as needed    Plastic Surgery   [facility]: [id number]  [facility]: [phone]

## 2024-11-01 NOTE — CONSULT NOTE ADULT - SUBJECTIVE AND OBJECTIVE BOX
Plastic Surgery Consult Note  (pg [facility]: [id number], [facility]: [phone])    HPI:   49M RHD PMH DM, active smoker presenting with 2 weeks of R IF pain at the fingertip/cuticle and dark discoloration under the nail. 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 Noticed small blue discoloration under nail, presented to urgent care with worsening pain 10/23 and received 1 week of clindamycin and cephalexin without improvement. Denies fever/chills. Presenting to ED with continued pain and discoloration under nail. In the ED AFVSS, R hand XR without acute bony injuries.     PAST MEDICAL & SURGICAL HISTORY:  DM (diabetes mellitus)      Atopic dermatitis        Allergies    No Known Allergies    Intolerances      Home Medications:  simvastatin 10 mg oral tablet: 1 tab(s) orally once a day (at bedtime) (12 Apr 2021 19:26)    MEDICATIONS  (STANDING):      SOCIAL HISTORY:  FAMILY HISTORY:  Family hx of melanoma (Father)        ___________________________________________  OBJECTIVE:  Vital Signs Last 24 Hrs  T(C): 36.7 (01 Nov 2024 12:01), Max: 36.7 (01 Nov 2024 12:01)  T(F): 98 (01 Nov 2024 12:01), Max: 98 (01 Nov 2024 12:01)  HR: 91 (01 Nov 2024 12:01) (91 - 91)  BP: 162/81 (01 Nov 2024 12:01) (162/81 - 162/81)  BP(mean): --  RR: 18 (01 Nov 2024 12:01) (18 - 18)  SpO2: 98% (01 Nov 2024 12:01) (98% - 98%)    Parameters below as of 01 Nov 2024 12:01  Patient On (Oxygen Delivery Method): room air    CAPILLARY BLOOD GLUCOSE      POCT Blood Glucose.: 172 mg/dL (01 Nov 2024 12:04)    I&O's Detail    General: Well developed, well nourished, NAD  Neuro: Alert and oriented  HEENT: NCAT  Respiratory: Airway patent, respirations unlabored  CVS: WWP  Examination of the RIGHT upper extremity reveals:  >> Compartments: Forearm and upper arm compartments soft and compressible.   >> Skin: R IF with ecchymosis at distal tip of nail, with dried blood under nail. Mild edema around cuticle. No drainage, fluctuance, or skin breaks. Fingertip, nail, cuticle TTP.   >> ROM: Passive and active ROM at R IF DIP limited by pain, motor exam otherwise wnl.   >> Sensory: Light touch intact in the radial, median, ulnar nerve distributions.  >> Pulses: palpable radial artery.  >> Capillary refill: <3 seconds.      ____________________________________________  LABS:  CBC Full  -  ( 01 Nov 2024 13:21 )  WBC Count : 10.09 K/uL  RBC Count : 4.72 M/uL  Hemoglobin : 16.4 g/dL  Hematocrit : 44.8 %  Platelet Count - Automated : 199 K/uL  Mean Cell Volume : 94.9 fL  Mean Cell Hemoglobin : 34.7 pg  Mean Cell Hemoglobin Concentration : 36.6 g/dL  Auto Neutrophil # : 6.22 K/uL  Auto Lymphocyte # : 1.79 K/uL  Auto Monocyte # : 0.95 K/uL  Auto Eosinophil # : 0.96 K/uL  Auto Basophil # : 0.14 K/uL  Auto Neutrophil % : 61.7 %  Auto Lymphocyte % : 17.7 %  Auto Monocyte % : 9.4 %  Auto Eosinophil % : 9.5 %  Auto Basophil % : 1.4 %    11-01    136  |  102  |  22  ----------------------------<  132[H]  4.3   |  21[L]  |  0.82    Ca    9.4      01 Nov 2024 13:21    TPro  7.4  /  Alb  4.3  /  TBili  0.4  /  DBili  x   /  AST  19  /  ALT  18  /  AlkPhos  79  11-01    LIVER FUNCTIONS - ( 01 Nov 2024 13:21 )  Alb: 4.3 g/dL / Pro: 7.4 g/dL / ALK PHOS: 79 U/L / ALT: 18 U/L / AST: 19 U/L / GGT: x             Urinalysis Basic - ( 01 Nov 2024 13:21 )    Color: x / Appearance: x / SG: x / pH: x  Gluc: 132 mg/dL / Ketone: x  / Bili: x / Urobili: x   Blood: x / Protein: x / Nitrite: x   Leuk Esterase: x / RBC: x / WBC x   Sq Epi: x / Non Sq Epi: x / Bacteria: x            ____________________________________________  MICRO:  RECENT CULTURES:    ____________________________________________  RADIOLOGY:

## 2024-11-01 NOTE — ED ADULT NURSE NOTE - OBJECTIVE STATEMENT
pt received to wellness presents with discoloration and swelling to second digit of rt hand x few days reports was seen at urgent care and started on antibiotics and advised to see hand surgeon however when attempting to make appt with surgeon they declined him stating he could not be seen d/t infection. a&ox4 ambulatory at baseline, +ROm in rt index finger. PA Gina at bedside assessing pt, will await orders and continue to monitor.

## 2024-11-01 NOTE — ED PROVIDER NOTE - PATIENT PORTAL LINK FT
You can access the FollowMyHealth Patient Portal offered by [facility] by registering at the following website: [email or web address]. By joining [facility]’s FollowMyHealth portal, you will also be able to view your health information using other applications (apps) compatible with our system.

## 2024-11-04 PROBLEM — Z00.00 ENCOUNTER FOR PREVENTIVE HEALTH EXAMINATION: Status: ACTIVE | Noted: 2024-11-04

## 2024-11-08 ENCOUNTER — APPOINTMENT (OUTPATIENT)
Dept: PLASTIC SURGERY | Facility: CLINIC | Age: 49
End: 2024-11-08
Payer: COMMERCIAL

## 2024-11-08 DIAGNOSIS — S69.91XA UNSPECIFIED INJURY OF RIGHT WRIST, HAND AND FINGER(S), INITIAL ENCOUNTER: ICD-10-CM

## 2024-11-08 PROCEDURE — 99203 OFFICE O/P NEW LOW 30 MIN: CPT

## 2024-11-10 PROBLEM — S69.91XA INJURY OF NAIL BED OF FINGER OF RIGHT HAND, INITIAL ENCOUNTER: Status: ACTIVE | Noted: 2024-11-10

## 2024-11-12 ENCOUNTER — OUTPATIENT (OUTPATIENT)
Dept: OUTPATIENT SERVICES | Facility: HOSPITAL | Age: 49
LOS: 1 days | End: 2024-11-12

## 2024-11-12 VITALS
HEART RATE: 86 BPM | TEMPERATURE: 98 F | DIASTOLIC BLOOD PRESSURE: 84 MMHG | HEIGHT: 67 IN | RESPIRATION RATE: 16 BRPM | SYSTOLIC BLOOD PRESSURE: 151 MMHG | OXYGEN SATURATION: 98 % | WEIGHT: 199.96 LBS

## 2024-11-12 DIAGNOSIS — Z98.49 CATARACT EXTRACTION STATUS, UNSPECIFIED EYE: Chronic | ICD-10-CM

## 2024-11-12 DIAGNOSIS — S69.91XA UNSPECIFIED INJURY OF RIGHT WRIST, HAND AND FINGER(S), INITIAL ENCOUNTER: ICD-10-CM

## 2024-11-12 DIAGNOSIS — Z98.890 OTHER SPECIFIED POSTPROCEDURAL STATES: Chronic | ICD-10-CM

## 2024-11-12 DIAGNOSIS — L60.8 OTHER NAIL DISORDERS: ICD-10-CM

## 2024-11-12 DIAGNOSIS — E11.9 TYPE 2 DIABETES MELLITUS WITHOUT COMPLICATIONS: ICD-10-CM

## 2024-11-12 DIAGNOSIS — R03.0 ELEVATED BLOOD-PRESSURE READING, WITHOUT DIAGNOSIS OF HYPERTENSION: ICD-10-CM

## 2024-11-12 LAB
A1C WITH ESTIMATED AVERAGE GLUCOSE RESULT: 6.1 % — HIGH (ref 4–5.6)
ESTIMATED AVERAGE GLUCOSE: 128 — SIGNIFICANT CHANGE UP

## 2024-11-12 RX ORDER — TIRZEPATIDE 5 MG/.5ML
7.5 INJECTION, SOLUTION SUBCUTANEOUS
Refills: 0 | DISCHARGE

## 2024-11-12 RX ORDER — FLUTICASONE PROPIONATE 50 UG/1
1 SPRAY, METERED NASAL
Refills: 0 | DISCHARGE

## 2024-11-12 RX ORDER — EMPAGLIFLOZIN 25 MG/1
1 TABLET, FILM COATED ORAL
Refills: 0 | DISCHARGE

## 2024-11-12 NOTE — H&P PST ADULT - NSICDXFAMILYHX_GEN_ALL_CORE_FT
FAMILY HISTORY:  Father  Still living? No  Family hx of melanoma, Age at diagnosis: Age Unknown  FH: HTN (hypertension), Age at diagnosis: Age Unknown    Mother  Still living? Yes, Estimated age: Age Unknown  FH: diabetes mellitus, Age at diagnosis: Age Unknown

## 2024-11-12 NOTE — H&P PST ADULT - NSCAFFEINEWITH_GEN_ALL_CORE_SD
Pt presenting to ed W R facial swelling, sts has a bad tooth, placed on po abx yesterday and was told has an infection, pt sts pain and swelling worsened since yesterday    headache

## 2024-11-12 NOTE — H&P PST ADULT - HISTORY OF PRESENT ILLNESS
Pt. is a 48 yo male that was cleaning under his nail.  Pt. noticed "a spot" under his nail that has increased in coverage of the nail.

## 2024-11-12 NOTE — H&P PST ADULT - MUSCULOSKELETAL
details… right index finger pain/normal/ROM intact/normal gait/strength 5/5 bilateral upper extremities/strength 5/5 bilateral lower extremities

## 2024-11-12 NOTE — H&P PST ADULT - PROBLEM SELECTOR PLAN 2
Pt. took last dose of Mounjaro 11/3/24.  Instructed pt. no Metformin morning of surgery.  Pt. informed that the Jardiance was to be held for 3 days prior to surgery.  Pt. stated he took Jardiance today, 11/12/24 and surgery is in 2 days, 11/14/24.  Will notify the Surgeon via email. Pt. took last dose of Mounjaro 11/3/24.  Instructed pt. no Metformin morning of surgery.  Pt. informed that the Jardiance was to be held for 3 days prior to surgery.  Pt. stated he took Jardiance today, 11/12/24 and surgery is in 2 days, 11/14/24.  Will notify the Surgeon via email.  HgBA1c drawn.

## 2024-11-12 NOTE — H&P PST ADULT - PROBLEM SELECTOR PLAN 1
Pt. is scheduled for a right index finger nail exploration 11/14/24.  Pt. verbalized understanding of instructions and that Chlorhexidine is for external use.

## 2024-11-12 NOTE — H&P PST ADULT - PROBLEM SELECTOR PLAN 3
Repeat 158/76.  Pt. is asymptomatic.  Pt. states "I drank a red bull before I got here."  Instructed pt. to decrease sodium intake and his caffeine.

## 2024-11-12 NOTE — H&P PST ADULT - NSICDXPASTMEDICALHX_GEN_ALL_CORE_FT
PAST MEDICAL HISTORY:  Atopic dermatitis     DM (diabetes mellitus)     Hyperlipidemia     Obese      PAST MEDICAL HISTORY:  Atopic dermatitis     Cyst of nasal sinus     DM (diabetes mellitus)     Hyperlipidemia     Obese

## 2024-11-12 NOTE — H&P PST ADULT - ASSESSMENT
Pt. is a 50 yo male with right index finger nailbed discoloration and pain.     Pt. states he takes Fluticasone "for a cyst in my nose."

## 2024-11-14 ENCOUNTER — RESULT REVIEW (OUTPATIENT)
Age: 49
End: 2024-11-14

## 2024-11-14 ENCOUNTER — APPOINTMENT (OUTPATIENT)
Dept: PLASTIC SURGERY | Facility: AMBULATORY SURGERY CENTER | Age: 49
End: 2024-11-14

## 2024-11-14 ENCOUNTER — TRANSCRIPTION ENCOUNTER (OUTPATIENT)
Age: 49
End: 2024-11-14

## 2024-11-14 ENCOUNTER — OUTPATIENT (OUTPATIENT)
Dept: OUTPATIENT SERVICES | Facility: HOSPITAL | Age: 49
LOS: 1 days | Discharge: HOME | End: 2024-11-14
Payer: COMMERCIAL

## 2024-11-14 VITALS
RESPIRATION RATE: 14 BRPM | WEIGHT: 200.62 LBS | SYSTOLIC BLOOD PRESSURE: 141 MMHG | DIASTOLIC BLOOD PRESSURE: 77 MMHG | TEMPERATURE: 98 F | HEART RATE: 91 BPM | OXYGEN SATURATION: 97 %

## 2024-11-14 VITALS
TEMPERATURE: 97 F | RESPIRATION RATE: 21 BRPM | OXYGEN SATURATION: 96 % | DIASTOLIC BLOOD PRESSURE: 77 MMHG | SYSTOLIC BLOOD PRESSURE: 147 MMHG | HEART RATE: 86 BPM

## 2024-11-14 DIAGNOSIS — S69.91XA UNSPECIFIED INJURY OF RIGHT WRIST, HAND AND FINGER(S), INITIAL ENCOUNTER: ICD-10-CM

## 2024-11-14 DIAGNOSIS — Z98.49 CATARACT EXTRACTION STATUS, UNSPECIFIED EYE: Chronic | ICD-10-CM

## 2024-11-14 DIAGNOSIS — Z98.890 OTHER SPECIFIED POSTPROCEDURAL STATES: Chronic | ICD-10-CM

## 2024-11-14 LAB
ALBUMIN SERPL ELPH-MCNC: 4.4 G/DL — SIGNIFICANT CHANGE UP (ref 3.3–5)
ALP SERPL-CCNC: 79 U/L — SIGNIFICANT CHANGE UP (ref 40–120)
ALT FLD-CCNC: 18 U/L — SIGNIFICANT CHANGE UP (ref 4–41)
ANION GAP SERPL CALC-SCNC: 12 MMOL/L — SIGNIFICANT CHANGE UP (ref 7–14)
AST SERPL-CCNC: 17 U/L — SIGNIFICANT CHANGE UP (ref 4–40)
B-OH-BUTYR SERPL-SCNC: <0 MMOL/L — SIGNIFICANT CHANGE UP (ref 0–0.4)
BILIRUB SERPL-MCNC: 0.6 MG/DL — SIGNIFICANT CHANGE UP (ref 0.2–1.2)
BLOOD GAS VENOUS COMPREHENSIVE RESULT: SIGNIFICANT CHANGE UP
BUN SERPL-MCNC: 13 MG/DL — SIGNIFICANT CHANGE UP (ref 7–23)
CALCIUM SERPL-MCNC: 9.3 MG/DL — SIGNIFICANT CHANGE UP (ref 8.4–10.5)
CHLORIDE SERPL-SCNC: 105 MMOL/L — SIGNIFICANT CHANGE UP (ref 98–107)
CO2 SERPL-SCNC: 23 MMOL/L — SIGNIFICANT CHANGE UP (ref 22–31)
CREAT SERPL-MCNC: 0.87 MG/DL — SIGNIFICANT CHANGE UP (ref 0.5–1.3)
EGFR: 105 ML/MIN/1.73M2 — SIGNIFICANT CHANGE UP
GLUCOSE BLDC GLUCOMTR-MCNC: 140 MG/DL — HIGH (ref 70–99)
GLUCOSE SERPL-MCNC: 133 MG/DL — HIGH (ref 70–99)
POTASSIUM SERPL-MCNC: 4.4 MMOL/L — SIGNIFICANT CHANGE UP (ref 3.5–5.3)
POTASSIUM SERPL-SCNC: 4.4 MMOL/L — SIGNIFICANT CHANGE UP (ref 3.5–5.3)
PROT SERPL-MCNC: 7.2 G/DL — SIGNIFICANT CHANGE UP (ref 6–8.3)
SODIUM SERPL-SCNC: 140 MMOL/L — SIGNIFICANT CHANGE UP (ref 135–145)

## 2024-11-14 PROCEDURE — 88305 TISSUE EXAM BY PATHOLOGIST: CPT | Mod: 26

## 2024-11-14 PROCEDURE — 11042 DBRDMT SUBQ TIS 1ST 20SQCM/<: CPT

## 2024-11-14 NOTE — ASU DISCHARGE PLAN (ADULT/PEDIATRIC) - NS MD DC FALL RISK RISK
For information on Fall & Injury Prevention, visit: [email or web address] OR  [email or web address] OR  https://www.cdc.gov/steadi/patient.html

## 2024-11-14 NOTE — BRIEF OPERATIVE NOTE - OPERATION/FINDINGS
Nail plate removed and nail bed/underlying bone appeared necrotic at the tip with no bleeding. Tissue sample sent to pathology. Nail re-inserted.  
pcp

## 2024-11-14 NOTE — ASU DISCHARGE PLAN (ADULT/PEDIATRIC) - CARE PROVIDER_API CALL
Rj Miller  [facility]  [id number] [place] [id number]  Phone: [phone]  Fax: [phone]  Follow Up Time: 1 week

## 2024-11-14 NOTE — ASU DISCHARGE PLAN (ADULT/PEDIATRIC) - ASU DC SPECIAL INSTRUCTIONSFT
Please keep the dressing on and dry until seeing Dr. Miller in the office next Wednesday.    Take over the counter medications for pain as needed. x2/50 feet

## 2024-11-14 NOTE — ASU DISCHARGE PLAN (ADULT/PEDIATRIC) - CALL YOUR DOCTOR IF YOU HAVE ANY OF THE FOLLOWING:
Bleeding that does not stop/Swelling that gets worse/Pain not relieved by Medications/Wound/Surgical Site with redness, or foul smelling discharge or pus/Numbness, tingling, color or temperature change to extremity Bleeding that does not stop/Swelling that gets worse/Pain not relieved by Medications/Fever greater than (need to indicate Fahrenheit or Celsius)/Wound/Surgical Site with redness, or foul smelling discharge or pus/Numbness, tingling, color or temperature change to extremity/Nausea and vomiting that does not stop/Inability to tolerate liquids or foods

## 2024-11-15 RX ORDER — IBUPROFEN 600 MG/1
600 TABLET, FILM COATED ORAL EVERY 6 HOURS
Qty: 120 | Refills: 0 | Status: ACTIVE | COMMUNITY
Start: 2024-11-15 | End: 1900-01-01

## 2024-11-15 RX ORDER — TRAMADOL HYDROCHLORIDE 50 MG/1
1 TABLET, COATED ORAL
Qty: 12 | Refills: 0
Start: 2024-11-15 | End: 2024-11-20

## 2024-11-15 RX ORDER — TRAMADOL HYDROCHLORIDE AND ACETAMINOPHEN 325; 37.5 MG/1; MG/1
1 TABLET, FILM COATED ORAL
Qty: 12 | Refills: 0
Start: 2024-11-15

## 2024-11-20 ENCOUNTER — APPOINTMENT (OUTPATIENT)
Dept: PLASTIC SURGERY | Facility: CLINIC | Age: 49
End: 2024-11-20
Payer: COMMERCIAL

## 2024-11-20 DIAGNOSIS — S69.91XA UNSPECIFIED INJURY OF RIGHT WRIST, HAND AND FINGER(S), INITIAL ENCOUNTER: ICD-10-CM

## 2024-11-20 PROCEDURE — 99024 POSTOP FOLLOW-UP VISIT: CPT

## 2024-11-21 ENCOUNTER — TRANSCRIPTION ENCOUNTER (OUTPATIENT)
Age: 49
End: 2024-11-21

## 2024-11-22 PROBLEM — E66.9 OBESITY, UNSPECIFIED: Chronic | Status: ACTIVE | Noted: 2024-11-12

## 2024-11-22 PROBLEM — E78.5 HYPERLIPIDEMIA, UNSPECIFIED: Chronic | Status: ACTIVE | Noted: 2024-11-12

## 2024-11-22 PROBLEM — J34.1 CYST AND MUCOCELE OF NOSE AND NASAL SINUS: Chronic | Status: ACTIVE | Noted: 2024-11-12

## 2024-11-22 LAB — SURGICAL PATHOLOGY STUDY: SIGNIFICANT CHANGE UP

## 2024-11-25 ENCOUNTER — NON-APPOINTMENT (OUTPATIENT)
Age: 49
End: 2024-11-25

## 2024-11-29 ENCOUNTER — APPOINTMENT (OUTPATIENT)
Dept: VASCULAR SURGERY | Facility: CLINIC | Age: 49
End: 2024-11-29
Payer: COMMERCIAL

## 2024-11-29 ENCOUNTER — APPOINTMENT (OUTPATIENT)
Dept: VASCULAR SURGERY | Facility: CLINIC | Age: 49
End: 2024-11-29

## 2024-11-29 VITALS
HEIGHT: 67 IN | DIASTOLIC BLOOD PRESSURE: 89 MMHG | TEMPERATURE: 97.7 F | SYSTOLIC BLOOD PRESSURE: 172 MMHG | WEIGHT: 200 LBS | HEART RATE: 92 BPM | BODY MASS INDEX: 31.39 KG/M2

## 2024-11-29 DIAGNOSIS — Z86.39 PERSONAL HISTORY OF OTHER ENDOCRINE, NUTRITIONAL AND METABOLIC DISEASE: ICD-10-CM

## 2024-11-29 DIAGNOSIS — E11.9 TYPE 2 DIABETES MELLITUS W/OUT COMPLICATIONS: ICD-10-CM

## 2024-11-29 DIAGNOSIS — I74.9 EMBOLISM AND THROMBOSIS OF UNSPECIFIED ARTERY: ICD-10-CM

## 2024-11-29 PROCEDURE — 93931 UPPER EXTREMITY STUDY: CPT

## 2024-11-29 PROCEDURE — 93923 UPR/LXTR ART STDY 3+ LVLS: CPT

## 2024-11-29 PROCEDURE — 99204 OFFICE O/P NEW MOD 45 MIN: CPT

## 2024-11-29 RX ORDER — CLOPIDOGREL BISULFATE 75 MG/1
75 TABLET, FILM COATED ORAL DAILY
Qty: 30 | Refills: 2 | Status: ACTIVE | COMMUNITY
Start: 2024-11-29 | End: 1900-01-01

## 2024-11-29 RX ORDER — GABAPENTIN 100 MG/1
100 CAPSULE ORAL
Qty: 90 | Refills: 0 | Status: ACTIVE | COMMUNITY
Start: 2024-11-29 | End: 1900-01-01

## 2024-12-04 ENCOUNTER — APPOINTMENT (OUTPATIENT)
Dept: CT IMAGING | Facility: IMAGING CENTER | Age: 49
End: 2024-12-04

## 2024-12-12 ENCOUNTER — APPOINTMENT (OUTPATIENT)
Dept: VASCULAR SURGERY | Facility: CLINIC | Age: 49
End: 2024-12-12

## (undated) DEVICE — SOL IRR POUR NS 0.9% 500ML

## (undated) DEVICE — PACK HAND TRAY

## (undated) DEVICE — POSITIONER FOAM EGG CRATE ULNAR 2PCS (PINK)

## (undated) DEVICE — DRSG ACE BANDAGE 2"

## (undated) DEVICE — DRSG XEROFORM 2 X 2"

## (undated) DEVICE — SUT MONOCRYL 5-0 18" P-3 UNDYED

## (undated) DEVICE — SUT ETHILON 4-0 18" PS-2

## (undated) DEVICE — BIPOLAR FORCEP KIRWAN JEWELERS STR 4" X 0.4MM W 12FT CORD

## (undated) DEVICE — PREP CHLORAPREP HI-LITE ORANGE 26ML

## (undated) DEVICE — DRSG STERISTRIPS 0.25 X 3"

## (undated) DEVICE — GLV 7.5 PROTEXIS (WHITE)

## (undated) DEVICE — DRSG COBAN 2" LF STERILE

## (undated) DEVICE — WARMING BLANKET LOWER ADULT

## (undated) DEVICE — VENODYNE/SCD SLEEVE CALF MEDIUM